# Patient Record
Sex: FEMALE | Race: WHITE | NOT HISPANIC OR LATINO | Employment: OTHER | URBAN - METROPOLITAN AREA
[De-identification: names, ages, dates, MRNs, and addresses within clinical notes are randomized per-mention and may not be internally consistent; named-entity substitution may affect disease eponyms.]

---

## 2017-01-05 ENCOUNTER — HOSPITAL ENCOUNTER (OUTPATIENT)
Dept: GENERAL RADIOLOGY | Facility: HOSPITAL | Age: 82
Discharge: HOME | End: 2017-01-05
Admitting: COLON & RECTAL SURGERY

## 2017-01-05 ENCOUNTER — APPOINTMENT (OUTPATIENT)
Dept: PREADMISSION TESTING | Facility: HOSPITAL | Age: 82
End: 2017-01-05

## 2017-01-05 VITALS — WEIGHT: 169.53 LBS | HEIGHT: 67 IN | BODY MASS INDEX: 26.61 KG/M2

## 2017-01-05 LAB
ALBUMIN SERPL-MCNC: 4 G/DL (ref 3.2–4.8)
ALBUMIN/GLOB SERPL: 1.6 G/DL (ref 1.5–2.5)
ALP SERPL-CCNC: 78 U/L (ref 25–100)
ALT SERPL W P-5'-P-CCNC: 16 U/L (ref 7–40)
ANION GAP SERPL CALCULATED.3IONS-SCNC: 10 MMOL/L (ref 3–11)
AST SERPL-CCNC: 18 U/L (ref 0–33)
BILIRUB SERPL-MCNC: 0.3 MG/DL (ref 0.3–1.2)
BUN BLD-MCNC: 10 MG/DL (ref 9–23)
BUN/CREAT SERPL: 9.1 (ref 7–25)
CALCIUM SPEC-SCNC: 10 MG/DL (ref 8.7–10.4)
CEA SERPL-MCNC: 2.4 NG/ML (ref 0–2.5)
CHLORIDE SERPL-SCNC: 103 MMOL/L (ref 99–109)
CO2 SERPL-SCNC: 29 MMOL/L (ref 20–31)
CREAT BLD-MCNC: 1.1 MG/DL (ref 0.6–1.3)
DEPRECATED RDW RBC AUTO: 54.5 FL (ref 37–54)
ERYTHROCYTE [DISTWIDTH] IN BLOOD BY AUTOMATED COUNT: 18.3 % (ref 11.3–14.5)
GFR SERPL CREATININE-BSD FRML MDRD: 48 ML/MIN/1.73
GLOBULIN UR ELPH-MCNC: 2.5 GM/DL
GLUCOSE BLD-MCNC: 108 MG/DL (ref 70–100)
HBA1C MFR BLD: 5.6 % (ref 4.8–5.6)
HCT VFR BLD AUTO: 35.4 % (ref 34.5–44)
HGB BLD-MCNC: 11.2 G/DL (ref 11.5–15.5)
MCH RBC QN AUTO: 25.6 PG (ref 27–31)
MCHC RBC AUTO-ENTMCNC: 31.6 G/DL (ref 32–36)
MCV RBC AUTO: 81 FL (ref 80–99)
PLATELET # BLD AUTO: 267 10*3/MM3 (ref 150–450)
PMV BLD AUTO: 8.5 FL (ref 6–12)
POTASSIUM BLD-SCNC: 3.8 MMOL/L (ref 3.5–5.5)
PROT SERPL-MCNC: 6.5 G/DL (ref 5.7–8.2)
RBC # BLD AUTO: 4.37 10*6/MM3 (ref 3.89–5.14)
SODIUM BLD-SCNC: 142 MMOL/L (ref 132–146)
WBC NRBC COR # BLD: 4.88 10*3/MM3 (ref 3.5–10.8)

## 2017-01-05 PROCEDURE — 85027 COMPLETE CBC AUTOMATED: CPT | Performed by: COLON & RECTAL SURGERY

## 2017-01-05 PROCEDURE — 83036 HEMOGLOBIN GLYCOSYLATED A1C: CPT | Performed by: COLON & RECTAL SURGERY

## 2017-01-05 PROCEDURE — 71020 HC CHEST PA AND LATERAL: CPT

## 2017-01-05 PROCEDURE — 82378 CARCINOEMBRYONIC ANTIGEN: CPT | Performed by: COLON & RECTAL SURGERY

## 2017-01-05 PROCEDURE — 80053 COMPREHEN METABOLIC PANEL: CPT | Performed by: COLON & RECTAL SURGERY

## 2017-01-05 PROCEDURE — 36415 COLL VENOUS BLD VENIPUNCTURE: CPT

## 2017-01-05 RX ORDER — CIPROFLOXACIN 500 MG/1
500 TABLET, FILM COATED ORAL 2 TIMES DAILY
COMMUNITY
Start: 2017-01-03 | End: 2017-01-13 | Stop reason: HOSPADM

## 2017-01-10 ENCOUNTER — HOSPITAL ENCOUNTER (INPATIENT)
Facility: HOSPITAL | Age: 82
LOS: 3 days | Discharge: HOME HEALTH | End: 2017-01-13
Attending: COLON & RECTAL SURGERY | Admitting: COLON & RECTAL SURGERY

## 2017-01-10 ENCOUNTER — ANESTHESIA EVENT (OUTPATIENT)
Dept: PERIOP | Facility: HOSPITAL | Age: 82
End: 2017-01-10

## 2017-01-10 ENCOUNTER — ANESTHESIA (OUTPATIENT)
Dept: PERIOP | Facility: HOSPITAL | Age: 82
End: 2017-01-10

## 2017-01-10 DIAGNOSIS — C18.9 COLON CANCER (HCC): ICD-10-CM

## 2017-01-10 LAB — POTASSIUM BLDA-SCNC: 2.98 MMOL/L (ref 3.5–5.3)

## 2017-01-10 PROCEDURE — 84132 ASSAY OF SERUM POTASSIUM: CPT | Performed by: ANESTHESIOLOGY

## 2017-01-10 PROCEDURE — 25010000003 POTASSIUM CHLORIDE 10 MEQ/100ML SOLUTION: Performed by: ANESTHESIOLOGY

## 2017-01-10 PROCEDURE — 0WQF0ZZ REPAIR ABDOMINAL WALL, OPEN APPROACH: ICD-10-PCS | Performed by: COLON & RECTAL SURGERY

## 2017-01-10 PROCEDURE — 25010000002 NEOSTIGMINE 10 MG/10ML SOLUTION: Performed by: NURSE ANESTHETIST, CERTIFIED REGISTERED

## 2017-01-10 PROCEDURE — 25010000002 HEPARIN (PORCINE) PER 1000 UNITS: Performed by: COLON & RECTAL SURGERY

## 2017-01-10 PROCEDURE — 25010000002 ONDANSETRON PER 1 MG: Performed by: NURSE ANESTHETIST, CERTIFIED REGISTERED

## 2017-01-10 PROCEDURE — 25010000002 DEXAMETHASONE SODIUM PHOSPHATE 10 MG/ML SOLUTION: Performed by: NURSE ANESTHETIST, CERTIFIED REGISTERED

## 2017-01-10 PROCEDURE — 25010000002 PROPOFOL 1000 MG/ML EMULSION: Performed by: NURSE ANESTHETIST, CERTIFIED REGISTERED

## 2017-01-10 PROCEDURE — 88309 TISSUE EXAM BY PATHOLOGIST: CPT | Performed by: COLON & RECTAL SURGERY

## 2017-01-10 PROCEDURE — 25010000002 BUPRENORPHINE PER 0.1 MG: Performed by: NURSE ANESTHETIST, CERTIFIED REGISTERED

## 2017-01-10 PROCEDURE — 25010000002 PROPOFOL 10 MG/ML EMULSION: Performed by: NURSE ANESTHETIST, CERTIFIED REGISTERED

## 2017-01-10 PROCEDURE — 0DTF0ZZ RESECTION OF RIGHT LARGE INTESTINE, OPEN APPROACH: ICD-10-PCS | Performed by: COLON & RECTAL SURGERY

## 2017-01-10 RX ORDER — HYDROCODONE BITARTRATE AND ACETAMINOPHEN 5; 325 MG/1; MG/1
1 TABLET ORAL EVERY 4 HOURS PRN
Status: DISCONTINUED | OUTPATIENT
Start: 2017-01-10 | End: 2017-01-12

## 2017-01-10 RX ORDER — PROMETHAZINE HYDROCHLORIDE 25 MG/ML
6.25 INJECTION, SOLUTION INTRAMUSCULAR; INTRAVENOUS ONCE AS NEEDED
Status: DISCONTINUED | OUTPATIENT
Start: 2017-01-10 | End: 2017-01-10 | Stop reason: HOSPADM

## 2017-01-10 RX ORDER — LIDOCAINE HYDROCHLORIDE 10 MG/ML
INJECTION, SOLUTION INFILTRATION; PERINEURAL AS NEEDED
Status: DISCONTINUED | OUTPATIENT
Start: 2017-01-10 | End: 2017-01-10 | Stop reason: SURG

## 2017-01-10 RX ORDER — SODIUM CHLORIDE, SODIUM LACTATE, POTASSIUM CHLORIDE, CALCIUM CHLORIDE 600; 310; 30; 20 MG/100ML; MG/100ML; MG/100ML; MG/100ML
9 INJECTION, SOLUTION INTRAVENOUS CONTINUOUS
Status: DISCONTINUED | OUTPATIENT
Start: 2017-01-10 | End: 2017-01-10 | Stop reason: SDUPTHER

## 2017-01-10 RX ORDER — FAMOTIDINE 10 MG/ML
20 INJECTION, SOLUTION INTRAVENOUS ONCE
Status: CANCELLED | OUTPATIENT
Start: 2017-01-10 | End: 2017-01-10

## 2017-01-10 RX ORDER — PROMETHAZINE HYDROCHLORIDE 25 MG/1
25 SUPPOSITORY RECTAL ONCE AS NEEDED
Status: DISCONTINUED | OUTPATIENT
Start: 2017-01-10 | End: 2017-01-10 | Stop reason: HOSPADM

## 2017-01-10 RX ORDER — CELECOXIB 200 MG/1
400 CAPSULE ORAL ONCE
Status: COMPLETED | OUTPATIENT
Start: 2017-01-10 | End: 2017-01-10

## 2017-01-10 RX ORDER — SCOLOPAMINE TRANSDERMAL SYSTEM 1 MG/1
1 PATCH, EXTENDED RELEASE TRANSDERMAL ONCE
Status: DISCONTINUED | OUTPATIENT
Start: 2017-01-10 | End: 2017-01-12

## 2017-01-10 RX ORDER — DEXAMETHASONE SODIUM PHOSPHATE 10 MG/ML
INJECTION, SOLUTION INTRAMUSCULAR; INTRAVENOUS AS NEEDED
Status: DISCONTINUED | OUTPATIENT
Start: 2017-01-10 | End: 2017-01-10 | Stop reason: SURG

## 2017-01-10 RX ORDER — ALVIMOPAN 12 MG/1
12 CAPSULE ORAL ONCE
Status: COMPLETED | OUTPATIENT
Start: 2017-01-10 | End: 2017-01-10

## 2017-01-10 RX ORDER — ROCURONIUM BROMIDE 10 MG/ML
INJECTION, SOLUTION INTRAVENOUS AS NEEDED
Status: DISCONTINUED | OUTPATIENT
Start: 2017-01-10 | End: 2017-01-10 | Stop reason: SURG

## 2017-01-10 RX ORDER — ONDANSETRON 2 MG/ML
INJECTION INTRAMUSCULAR; INTRAVENOUS AS NEEDED
Status: DISCONTINUED | OUTPATIENT
Start: 2017-01-10 | End: 2017-01-10 | Stop reason: SURG

## 2017-01-10 RX ORDER — FAMOTIDINE 20 MG/1
20 TABLET, FILM COATED ORAL ONCE
Status: COMPLETED | OUTPATIENT
Start: 2017-01-10 | End: 2017-01-10

## 2017-01-10 RX ORDER — SODIUM CHLORIDE 9 MG/ML
200 INJECTION, SOLUTION INTRAVENOUS CONTINUOUS
Status: DISCONTINUED | OUTPATIENT
Start: 2017-01-10 | End: 2017-01-12

## 2017-01-10 RX ORDER — GABAPENTIN 300 MG/1
600 CAPSULE ORAL 2 TIMES DAILY
Status: DISCONTINUED | OUTPATIENT
Start: 2017-01-10 | End: 2017-01-12

## 2017-01-10 RX ORDER — MAGNESIUM HYDROXIDE 1200 MG/15ML
LIQUID ORAL AS NEEDED
Status: DISCONTINUED | OUTPATIENT
Start: 2017-01-10 | End: 2017-01-10 | Stop reason: HOSPADM

## 2017-01-10 RX ORDER — HYDROMORPHONE HYDROCHLORIDE 1 MG/ML
0.5 INJECTION, SOLUTION INTRAMUSCULAR; INTRAVENOUS; SUBCUTANEOUS
Status: DISCONTINUED | OUTPATIENT
Start: 2017-01-10 | End: 2017-01-10 | Stop reason: HOSPADM

## 2017-01-10 RX ORDER — ALVIMOPAN 12 MG/1
12 CAPSULE ORAL 2 TIMES DAILY
Status: DISCONTINUED | OUTPATIENT
Start: 2017-01-11 | End: 2017-01-12

## 2017-01-10 RX ORDER — HEPARIN SODIUM 5000 [USP'U]/ML
5000 INJECTION, SOLUTION INTRAVENOUS; SUBCUTANEOUS EVERY 8 HOURS SCHEDULED
Status: DISCONTINUED | OUTPATIENT
Start: 2017-01-10 | End: 2017-01-13 | Stop reason: HOSPADM

## 2017-01-10 RX ORDER — FENTANYL CITRATE 50 UG/ML
50 INJECTION, SOLUTION INTRAMUSCULAR; INTRAVENOUS
Status: DISCONTINUED | OUTPATIENT
Start: 2017-01-10 | End: 2017-01-10 | Stop reason: HOSPADM

## 2017-01-10 RX ORDER — ACETAMINOPHEN 500 MG
1000 TABLET ORAL 3 TIMES DAILY
Status: DISCONTINUED | OUTPATIENT
Start: 2017-01-10 | End: 2017-01-13 | Stop reason: HOSPADM

## 2017-01-10 RX ORDER — DIAZEPAM 5 MG/ML
2.5 INJECTION, SOLUTION INTRAMUSCULAR; INTRAVENOUS EVERY 6 HOURS PRN
Status: DISCONTINUED | OUTPATIENT
Start: 2017-01-10 | End: 2017-01-12

## 2017-01-10 RX ORDER — LEVOTHYROXINE SODIUM 0.07 MG/1
75 TABLET ORAL
Status: DISCONTINUED | OUTPATIENT
Start: 2017-01-11 | End: 2017-01-13 | Stop reason: HOSPADM

## 2017-01-10 RX ORDER — LIDOCAINE HYDROCHLORIDE 10 MG/ML
1 INJECTION, SOLUTION EPIDURAL; INFILTRATION; INTRACAUDAL; PERINEURAL ONCE
Status: COMPLETED | OUTPATIENT
Start: 2017-01-10 | End: 2017-01-10

## 2017-01-10 RX ORDER — PROPOFOL 10 MG/ML
VIAL (ML) INTRAVENOUS AS NEEDED
Status: DISCONTINUED | OUTPATIENT
Start: 2017-01-10 | End: 2017-01-10 | Stop reason: SURG

## 2017-01-10 RX ORDER — PROPRANOLOL HYDROCHLORIDE 20 MG/1
60 TABLET ORAL EVERY 12 HOURS SCHEDULED
Status: DISCONTINUED | OUTPATIENT
Start: 2017-01-10 | End: 2017-01-10

## 2017-01-10 RX ORDER — NEOSTIGMINE METHYLSULFATE 1 MG/ML
INJECTION, SOLUTION INTRAVENOUS AS NEEDED
Status: DISCONTINUED | OUTPATIENT
Start: 2017-01-10 | End: 2017-01-10 | Stop reason: SURG

## 2017-01-10 RX ORDER — PROPRANOLOL HYDROCHLORIDE 20 MG/1
60 TABLET ORAL EVERY 12 HOURS SCHEDULED
Status: DISCONTINUED | OUTPATIENT
Start: 2017-01-11 | End: 2017-01-13 | Stop reason: HOSPADM

## 2017-01-10 RX ORDER — GLYCOPYRROLATE 0.2 MG/ML
INJECTION INTRAMUSCULAR; INTRAVENOUS AS NEEDED
Status: DISCONTINUED | OUTPATIENT
Start: 2017-01-10 | End: 2017-01-10 | Stop reason: SURG

## 2017-01-10 RX ORDER — SODIUM CHLORIDE 0.9 % (FLUSH) 0.9 %
1-10 SYRINGE (ML) INJECTION AS NEEDED
Status: DISCONTINUED | OUTPATIENT
Start: 2017-01-10 | End: 2017-01-10 | Stop reason: HOSPADM

## 2017-01-10 RX ORDER — NALOXONE HCL 0.4 MG/ML
0.4 VIAL (ML) INJECTION
Status: DISCONTINUED | OUTPATIENT
Start: 2017-01-10 | End: 2017-01-12

## 2017-01-10 RX ORDER — POTASSIUM CHLORIDE 7.45 MG/ML
10 INJECTION INTRAVENOUS ONCE
Status: COMPLETED | OUTPATIENT
Start: 2017-01-10 | End: 2017-01-10

## 2017-01-10 RX ORDER — PROMETHAZINE HYDROCHLORIDE 25 MG/1
25 TABLET ORAL ONCE AS NEEDED
Status: DISCONTINUED | OUTPATIENT
Start: 2017-01-10 | End: 2017-01-10 | Stop reason: HOSPADM

## 2017-01-10 RX ORDER — BUPIVACAINE HYDROCHLORIDE 2.5 MG/ML
INJECTION, SOLUTION EPIDURAL; INFILTRATION; INTRACAUDAL AS NEEDED
Status: DISCONTINUED | OUTPATIENT
Start: 2017-01-10 | End: 2017-01-10 | Stop reason: SURG

## 2017-01-10 RX ORDER — PREGABALIN 75 MG/1
75 CAPSULE ORAL ONCE
Status: COMPLETED | OUTPATIENT
Start: 2017-01-10 | End: 2017-01-10

## 2017-01-10 RX ORDER — MORPHINE SULFATE 2 MG/ML
2 INJECTION, SOLUTION INTRAMUSCULAR; INTRAVENOUS
Status: DISCONTINUED | OUTPATIENT
Start: 2017-01-10 | End: 2017-01-12

## 2017-01-10 RX ORDER — HEPARIN SODIUM 5000 [USP'U]/ML
5000 INJECTION, SOLUTION INTRAVENOUS; SUBCUTANEOUS ONCE
Status: COMPLETED | OUTPATIENT
Start: 2017-01-10 | End: 2017-01-10

## 2017-01-10 RX ORDER — BUPRENORPHINE HYDROCHLORIDE 0.32 MG/ML
INJECTION INTRAMUSCULAR; INTRAVENOUS AS NEEDED
Status: DISCONTINUED | OUTPATIENT
Start: 2017-01-10 | End: 2017-01-10 | Stop reason: SURG

## 2017-01-10 RX ORDER — ACETAMINOPHEN 500 MG
1000 TABLET ORAL ONCE
Status: COMPLETED | OUTPATIENT
Start: 2017-01-10 | End: 2017-01-10

## 2017-01-10 RX ORDER — ONDANSETRON 2 MG/ML
4 INJECTION INTRAMUSCULAR; INTRAVENOUS EVERY 6 HOURS PRN
Status: DISCONTINUED | OUTPATIENT
Start: 2017-01-10 | End: 2017-01-13 | Stop reason: HOSPADM

## 2017-01-10 RX ORDER — DIAZEPAM 5 MG/1
5 TABLET ORAL EVERY 6 HOURS PRN
Status: DISCONTINUED | OUTPATIENT
Start: 2017-01-10 | End: 2017-01-12

## 2017-01-10 RX ADMIN — GLYCOPYRROLATE 0.2 MG: 0.2 INJECTION, SOLUTION INTRAMUSCULAR; INTRAVENOUS at 13:49

## 2017-01-10 RX ADMIN — SODIUM CHLORIDE, POTASSIUM CHLORIDE, SODIUM LACTATE AND CALCIUM CHLORIDE 9 ML/HR: 600; 310; 30; 20 INJECTION, SOLUTION INTRAVENOUS at 11:41

## 2017-01-10 RX ADMIN — POTASSIUM CHLORIDE 10 MEQ: 7.46 INJECTION, SOLUTION INTRAVENOUS at 12:28

## 2017-01-10 RX ADMIN — PREGABALIN 75 MG: 75 CAPSULE ORAL at 12:31

## 2017-01-10 RX ADMIN — LIDOCAINE HYDROCHLORIDE 0.2 ML: 10 INJECTION, SOLUTION EPIDURAL; INFILTRATION; INTRACAUDAL; PERINEURAL at 11:41

## 2017-01-10 RX ADMIN — GLYCOPYRROLATE 0.4 MG: 0.2 INJECTION, SOLUTION INTRAMUSCULAR; INTRAVENOUS at 14:14

## 2017-01-10 RX ADMIN — ERTAPENEM SODIUM 1 G: 1 INJECTION, POWDER, LYOPHILIZED, FOR SOLUTION INTRAMUSCULAR; INTRAVENOUS at 13:04

## 2017-01-10 RX ADMIN — SODIUM CHLORIDE 100 ML/HR: 9 INJECTION, SOLUTION INTRAVENOUS at 15:12

## 2017-01-10 RX ADMIN — ONDANSETRON 4 MG: 2 INJECTION INTRAMUSCULAR; INTRAVENOUS at 14:12

## 2017-01-10 RX ADMIN — BUPIVACAINE HYDROCHLORIDE 30 ML: 2.5 INJECTION, SOLUTION EPIDURAL; INFILTRATION; INTRACAUDAL; PERINEURAL at 13:17

## 2017-01-10 RX ADMIN — NEOSTIGMINE METHYLSULFATE 2 MG: 1 INJECTION, SOLUTION INTRAVENOUS at 14:25

## 2017-01-10 RX ADMIN — LIDOCAINE HYDROCHLORIDE 50 MG: 10 INJECTION, SOLUTION INFILTRATION; PERINEURAL at 13:13

## 2017-01-10 RX ADMIN — ROCURONIUM BROMIDE 10 MG: 10 INJECTION INTRAVENOUS at 14:00

## 2017-01-10 RX ADMIN — BUPRENORPHINE HYDROCHLORIDE 150 MCG: 0.3 INJECTION INTRAMUSCULAR; INTRAVENOUS at 13:14

## 2017-01-10 RX ADMIN — EPHEDRINE SULFATE 10 MG: 50 INJECTION INTRAMUSCULAR; INTRAVENOUS; SUBCUTANEOUS at 13:46

## 2017-01-10 RX ADMIN — CELECOXIB 400 MG: 200 CAPSULE ORAL at 12:32

## 2017-01-10 RX ADMIN — DEXAMETHASONE SODIUM PHOSPHATE 2 MG: 10 INJECTION, SOLUTION INTRAMUSCULAR; INTRAVENOUS at 13:17

## 2017-01-10 RX ADMIN — GABAPENTIN 600 MG: 300 CAPSULE ORAL at 21:43

## 2017-01-10 RX ADMIN — ACETAMINOPHEN 1000 MG: 500 TABLET ORAL at 12:31

## 2017-01-10 RX ADMIN — BUPRENORPHINE HYDROCHLORIDE 150 MCG: 0.3 INJECTION INTRAMUSCULAR; INTRAVENOUS at 13:17

## 2017-01-10 RX ADMIN — BUPIVACAINE HYDROCHLORIDE 30 ML: 2.5 INJECTION, SOLUTION EPIDURAL; INFILTRATION; INTRACAUDAL; PERINEURAL at 13:14

## 2017-01-10 RX ADMIN — DEXAMETHASONE SODIUM PHOSPHATE 2 MG: 10 INJECTION, SOLUTION INTRAMUSCULAR; INTRAVENOUS at 13:14

## 2017-01-10 RX ADMIN — NEOSTIGMINE METHYLSULFATE 3 MG: 1 INJECTION, SOLUTION INTRAVENOUS at 14:14

## 2017-01-10 RX ADMIN — HEPARIN SODIUM 5000 UNITS: 5000 INJECTION, SOLUTION INTRAVENOUS; SUBCUTANEOUS at 12:06

## 2017-01-10 RX ADMIN — PROPOFOL 200 MG: 10 INJECTION, EMULSION INTRAVENOUS at 13:13

## 2017-01-10 RX ADMIN — GLYCOPYRROLATE 0.4 MG: 0.2 INJECTION, SOLUTION INTRAMUSCULAR; INTRAVENOUS at 14:25

## 2017-01-10 RX ADMIN — PROPOFOL 25 MCG/KG/MIN: 10 INJECTION, EMULSION INTRAVENOUS at 13:24

## 2017-01-10 RX ADMIN — ACETAMINOPHEN 1000 MG: 500 TABLET, FILM COATED ORAL at 21:43

## 2017-01-10 RX ADMIN — SODIUM CHLORIDE, POTASSIUM CHLORIDE, SODIUM LACTATE AND CALCIUM CHLORIDE: 600; 310; 30; 20 INJECTION, SOLUTION INTRAVENOUS at 13:02

## 2017-01-10 RX ADMIN — HEPARIN SODIUM 5000 UNITS: 5000 INJECTION, SOLUTION INTRAVENOUS; SUBCUTANEOUS at 21:43

## 2017-01-10 RX ADMIN — DEXAMETHASONE SODIUM PHOSPHATE 6 MG: 10 INJECTION, SOLUTION INTRAMUSCULAR; INTRAVENOUS at 13:27

## 2017-01-10 RX ADMIN — ROCURONIUM BROMIDE 30 MG: 10 INJECTION INTRAVENOUS at 13:13

## 2017-01-10 RX ADMIN — FAMOTIDINE 20 MG: 20 TABLET ORAL at 12:31

## 2017-01-10 RX ADMIN — ALVIMOPAN 12 MG: 12 CAPSULE ORAL at 12:31

## 2017-01-10 RX ADMIN — SODIUM CHLORIDE 1000 ML: 9 INJECTION, SOLUTION INTRAVENOUS at 16:30

## 2017-01-10 RX ADMIN — SCOPALAMINE 1 PATCH: 1 PATCH, EXTENDED RELEASE TRANSDERMAL at 12:05

## 2017-01-10 NOTE — H&P
"[facility] Pre-op    Full history and physical note from office is up to date.  See paper copy on chart.    Visit Vitals   • /63 (BP Location: Right arm, Patient Position: Lying)   • Pulse 66   • Temp 98.3 °F (36.8 °C) (Temporal Artery )   • Resp 18   • Ht 67\" (170.2 cm)   • Wt 171 lb (77.6 kg)   • SpO2 94%   • BMI 26.78 kg/m2       IMM:  Influenza:2016  Pneumococcal: UTD  Tetanus: Unknown    Fidelina Tovar, EDUARDO 1/10/2017 12:00 PM   [facility] Pre-op    "

## 2017-01-10 NOTE — IP AVS SNAPSHOT
AFTER VISIT SUMMARY             Janet Truong           About your hospitalization     You were admitted on:  January 10, 2017 You last received care in the:  [address]       Procedures & Surgeries      Procedure(s) (LRB):  EXTENDED RIGHT HEMICOLECTOMY UMBILICAL HERNIA REPAIR (N/A)     1/10/2017     Surgeon(s):  Hernan Menon MD  -------------------      Medications    If you or your caregiver advised us that you are currently taking a medication and that medication is marked below as “Resume”, this simply indicates that we have reviewed those medications to make sure our new therapy recommendations do not interfere.  If you have concerns about medications other than those new ones which we are prescribing today, please consult the physician who prescribed them (or your primary physician).  Our review of your home medications is not meant to indicate that we are directing their use.             Your Medications      START taking these medications     fluconazole 100 MG tablet   Take 1 tablet by mouth Daily for 10 days.   Commonly known as:  DIFLUCAN             CONTINUE taking these medications     albuterol (2.5 MG/3ML) 0.083% nebulizer solution   Take 2.5 mg by nebulization 2 (Two) Times a Day.   Commonly known as:  PROVENTIL           ferrous sulfate 325 (65 FE) MG tablet   Take 325 mg by mouth 2 (Two) Times a Day.           I-tamia tablet tablet   Take 1 tablet by mouth Daily.           levothyroxine 75 MCG tablet   Take 75 mcg by mouth Daily.   Last time this was given:  1/13/2017  5:39 AM   Commonly known as:  SYNTHROID, LEVOTHROID           primidone 50 MG tablet   Take 50 mg by mouth 2 (Two) Times a Day.   Commonly known as:  MYSOLINE           propranolol 60 MG tablet   Take 60 mg by mouth 2 (Two) Times a Day.   Last time this was given:  1/13/2017  8:39 AM   Commonly known as:  INDERAL           PROTONIX 40 MG EC tablet   Take 40 mg by mouth 2 (Two) Times a Day.   Generic drug:   pantoprazole           tiotropium 18 MCG per inhalation capsule   Place 2 capsules into inhaler and inhale Daily.   Commonly known as:  SPIRIVA             STOP taking these medications     ciprofloxacin 500 MG tablet   Commonly known as:  CIPRO                Where to Get Your Medications      These medications were sent to [facility] - [place], [place] - [address] - [phone]  - [phone] FX  [address] [facility] [address]     Phone:  [phone]     fluconazole 100 MG tablet                  Your Medications      Your Medication List           Morning Noon Evening Bedtime As Needed    albuterol (2.5 MG/3ML) 0.083% nebulizer solution   Take 2.5 mg by nebulization 2 (Two) Times a Day.   Commonly known as:  PROVENTIL                                ferrous sulfate 325 (65 FE) MG tablet   Take 325 mg by mouth 2 (Two) Times a Day.                                fluconazole 100 MG tablet   Take 1 tablet by mouth Daily for 10 days.   Commonly known as:  DIFLUCAN                                I-tamia tablet tablet   Take 1 tablet by mouth Daily.                                levothyroxine 75 MCG tablet   Take 75 mcg by mouth Daily.   Commonly known as:  SYNTHROID, LEVOTHROID                                primidone 50 MG tablet   Take 50 mg by mouth 2 (Two) Times a Day.   Commonly known as:  MYSOLINE                                propranolol 60 MG tablet   Take 60 mg by mouth 2 (Two) Times a Day.   Commonly known as:  INDERAL                                PROTONIX 40 MG EC tablet   Take 40 mg by mouth 2 (Two) Times a Day.   Generic drug:  pantoprazole                                tiotropium 18 MCG per inhalation capsule   Place 2 capsules into inhaler and inhale Daily.   Commonly known as:  SPIRIVA                                         Instructions for After Discharge         Follow-ups for After Discharge        Referrals and Follow-ups to Schedule     Call Dr. Menon Office [phone]  for an appointment in 1-2 weeks.            MyChart Signup     Our records indicate that you have declined [facility] sign[facility]. If you would like to sign up for [employer], please email [email or web address] or call [phone] to obtain an activation code.         Summary of Your Hospitalization        Reason for Hospitalization     Your primary diagnosis was:  Not on File    Your diagnoses also included:  Colon Cancer      Care Providers     Provider Service Role Specialty    Hernan Menon MD Colorectal Attending Provider Colon and Rectal Surgery    Hernan Menon MD Colorectal Surgeon  Colon and Rectal Surgery      Your Allergies  Date Reviewed: 1/10/2017    Allergen Reactions    Phenobarbital Rash      Patient Belongings Returned     Document Return of Belongings Flowsheet     Were the patient bedside belongings sent home?   --   Belongings Retrieved from Security & Sent Home   --    Belongings Sent to Safe   --   Medications Retrieved from Pharmacy & Sent Home   --            PREVENTING SURGICAL SITE INFECTIONS     Surgical Site Infections FAQs  What is a Surgical Site Infection (SSI)?  A surgical site infection is an infection that occurs after surgery in the part of the body where the surgery took place. Most patients who have surgery do not develop an infection. However, infections develop in about 1 to 3 out of every 100 patients who have surgery.  Some of the common symptoms of a surgical site infection are:  · Redness and pain around the area where you had surgery  · Drainage of cloudy fluid from your surgical wound  · Fever  Can SSIs be treated?  Yes. Most surgical site infections can be treated with antibiotics. The antibiotic given to you depends on the bacteria (germs) causing the infection. Sometimes patients with SSIs also need another surgery to treat the infection.  What are some of the things that hospitals are doing to prevent SSIs?  To prevent SSIs, doctors, nurses, and  other healthcare providers:  · Clean their hands and arms up to their elbows with an antiseptic agent just before the surgery.  · Clean their hands with soap and water or an alcohol-based hand rub before and after caring for each patient.  · May remove some of your hair immediately before your surgery using electric clippers if the hair is in the same area where the procedure will occur. They should not shave you with a razor.  · Wear special hair covers, masks, gowns, and gloves during surgery to keep the surgery area clean.  · Give you antibiotics before your surgery starts. In most cases, you should get antibiotics within 60 minutes before the surgery starts and the antibiotics should be stopped within 24 hours after surgery.  · Clean the skin at the site of your surgery with a special soap that kills germs.  What can I do to help prevent SSIs?  Before your surgery:  · Tell your doctor about other medical problems you may have. Health problems such as allergies, diabetes, and obesity could affect your surgery and your treatment.  · Quit smoking. Patients who smoke get more infections. Talk to your doctor about how you can quit before your surgery.  · Do not shave near where you will have surgery. Shaving with a razor can irritate your skin and make it easier to develop an infection.  At the time of your surgery:  · Speak up if someone tries to shave you with a razor before surgery. Ask why you need to be shaved and talk with your surgeon if you have any concerns.  · Ask if you will get antibiotics before surgery.  After your surgery:  · Make sure that your healthcare providers clean their hands before examining you, either with soap and water or an alcohol-based hand rub.    If you do not see your providers clean their hands, please ask them to do so.  · Family and friends who visit you should not touch the surgical wound or dressings.  · Family and friends should clean their hands with soap and water or an  alcohol-based hand rub before and after visiting you. If you do not see them clean their hands, ask them to clean their hands.  What do I need to do when I go home from the hospital?  · Before you go home, your doctor or nurse should explain everything you need to know about taking care of your wound. Make sure you understand how to care for your wound before you leave the hospital.  · Always clean your hands before and after caring for your wound.  · Before you go home, make sure you know who to contact if you have questions or problems after you get home.  · If you have any symptoms of an infection, such as redness and pain at the surgery site, drainage, or fever, call your doctor immediately.  If you have additional questions, please ask your doctor or nurse.  Developed and co-sponsored by The Society for Healthcare Epidemiology of [place] (SHEA); Infectious Diseases Society of [place] (IDSA); American Hospital Association; Association for Professionals in Infection Control and Epidemiology (APIC); Centers for Disease Control and Prevention (CDC); and The Joint Commission.     This information is not intended to replace advice given to you by your health care provider. Make sure you discuss any questions you have with your health care provider.     Document Released: 12/23/2014 Document Revised: 01/08/2016 Document Reviewed: 03/02/2016  [employer] Interactive Patient Education ©2016 [employer]             SYMPTOMS OF A STROKE    Call 911 or have someone take you to the Emergency Department if you have any of the following:    · Sudden numbness or weakness of your face, arm or leg especially on one side of the body  · Sudden confusion, diffiiculty speaking or trouble understanding   · Changes in your vision or loss of sight in one eye  · Sudden severe headache with no known cause  · sudden dizziness, trouble walking, loss of balance or coordination    It is important to seek emergency care right away if you have  further stroke symptoms. If you get emergency help quickly, the powerful clot-dissolving medicines can reduce the disabilities caused by a stroke.     For more information:    American Stroke Association  5-576-8-STROKE  www.strokeassociation.org           IF YOU SMOKE OR USE TOBACCO PLEASE READ THE FOLLOWING:    Why is smoking bad for me?  Smoking increases the risk of heart disease, lung disease, vascular disease, stroke, and cancer.     If you smoke, STOP!    If you would like more information on quitting smoking, please visit the [employer] website: [email or web address]   This link will provide additional resources including the QUIT line and the Beat the Pack support groups.     For more information:    American Cancer Society  (102) 109-5032    American Heart Association  1-928.661.8776               YOU ARE THE MOST IMPORTANT FACTOR IN YOUR RECOVERY.     Follow all instructions carefully.     I have reviewed my discharge instructions with my nurse, including the following information, if applicable:     Information about my illness and diagnosis   Follow up appointments (including lab draws)   Wound Care   Equipment Needs   Medications (new and continuing) along with side effects   Preventative information such as vaccines and smoking cessations   Diet   Pain   I know when to contact my Doctor's office or seek emergency care      I want my nurse to describe the side effects of my medications: YES NO   If the answer is no, I understand the side effects of my medications: YES NO   My nurse described the side effects of my medications in a way that I could understand: YES NO   I have taken my personal belongings and my own medications with me at discharge: YES NO            I have received this information and my questions have been answered. I have discussed any concerns I see with this plan with the nurse or physician. I understand these  instructions.    Signature of Patient or Responsible Person: _____________________________________    Date: _________________  Time: __________________    Signature of Healthcare Provider: _______________________________________  Date: _________________  Time: __________________

## 2017-01-10 NOTE — ANESTHESIA POSTPROCEDURE EVALUATION
Patient: Janet Truong    Procedure Summary     Date Anesthesia Start Anesthesia Stop Room / Location    01/10/17 1304  [facility] / [facility]       Procedure Diagnosis Surgeon Provider    EXTENDED RIGHT HEMICOLECTOMY UMBILICAL HERNIA REPAIR (N/A Abdomen) No diagnosis on file. MD Ang St MD          Anesthesia Type: general  Last vitals  BP      Temp      Pulse     Resp      SpO2        Post Anesthesia Care and Evaluation    Patient location during evaluation: PACU  Patient participation: complete - patient participated  Level of consciousness: awake and alert  Pain management: adequate  Airway patency: patent  Anesthetic complications: No anesthetic complications  Cardiovascular status: hemodynamically stable and acceptable  Respiratory status: nonlabored ventilation, acceptable and nasal cannula  Hydration status: acceptable

## 2017-01-10 NOTE — OP NOTE
DATE OF PROCEDURE:  01/10/2017    PREOPERATIVE DIAGNOSES:  1. Right colon cancer.  2. Hepatic flexure polyp.    POSTOPERATIVE DIAGNOSES:  1. Right colon cancer.  2. Hepatic flexure polyp.  3. Small umbilical hernia.     PROCEDURES PERFORMED:  1. Extended right hemicolectomy.   2. Umbilical hernia repair.     SURGEON: Hernan Menon MD    HISTORY OF PRESENT ILLNESS: An 81-year-old female who had a screening colonoscopy by Dr. Pierre who removed multiple large polyps. He was unable to clear a benign looking polyp near the hepatic flexure and he put an ink spot in that. He found a small cancer in the right colon warranting this resection.     DESCRIPTION OF PROCEDURE: The patient was taken to the operating room and placed under general anesthesia, positioned supine and a Edwards anchored. TAP block was performed by anesthesia. Her abdomen was prepped and draped appropriately followed by a timeout. The abdomen was opened through a lower midline incision and extended slightly above the umbilicus. Fat pad was removed from a small umbilical hernia. The small bowel was run and was normal. The uterus, ovaries, and gallbladder were absent. A fair amount of scar was present in the right upper quadrant, but the liver felt and looked normal. An ink spot was found in the proximal transverse colon. I could not feel the right colon lesion.     The omentum was taken off the proximal half of the transverse colon and the lesser sac was entered to deflect the stomach superiorly. The hepatic flexure was taken down and then I was able to mobilize the entire right colon off the line of Toldt and sweep it medial off the duodenum. The cecum and ileum were mobilized out of the pelvis. High ligation of the right colic was carried out. I then was able to line up the ileum next to the midtransverse colon and I freed the mesentery from spots on both. All the other blood supply had been removed. Antimesenteric enterotomies were made in both, and  then a TRACIE-75 green stapler was used to make an anastomosis between the terminal ileum and transverse colon. Reapplication of the stapler perpendicular to the original firing was used to complete the resection and close the enterotomy. The enterotomy was wide open and the lines looked good. I opened the specimen on the back table showing a benign hepatic flexure polyp and about a 15 or 18 mm right colon cancer right near the valve.     The abdomen was irrigated with saline and the GI contents placed back anatomically with the omentum in the pelvis. With the sponge, instrument and needle count being correct, the fascia was closed in a single layer with #1 PDS. Subcutaneous tissue was irrigated with saline and skin closed with staples, and a Telfa dressing applied. The umbilical hernia was closed with the fascial repair. Blood loss was under 50 mL. She tolerated the procedure well.       MD DESTINI Thibodeaux/olvin  DD: 01/10/2017 14:23:30  DT: 01/10/2017 18:33:23  Voice Rec. ID #[id number]  Voice Original ID #[id number]  Doc ID #[id number]  Rev. #0  cc:

## 2017-01-10 NOTE — ANESTHESIA PROCEDURE NOTES
Airway  Urgency: elective    Airway not difficult    General Information and Staff    Patient location during procedure: OR  Anesthesiologist: YINA PINZON    Indications and Patient Condition  Indications for airway management: airway protection    Preoxygenated: yes  MILS not maintained throughout  Mask difficulty assessment: 1 - vent by mask    Final Airway Details  Final airway type: endotracheal airway      Successful airway: ETT  Cuffed: yes   Successful intubation technique: direct laryngoscopy  Endotracheal tube insertion site: oral  Blade: Isis  Blade size: #3  ETT size: 7.0 mm  Cormack-Lehane Classification: grade I - full view of glottis  Placement verified by: chest auscultation and capnometry   Measured from: lips  ETT to lips (cm): 20  Number of attempts at approach: 1    Additional Comments  Negative epigastric sounds, Breath sound equal bilaterally with symmetric chest rise and fall

## 2017-01-10 NOTE — ANESTHESIA PREPROCEDURE EVALUATION
Anesthesia Evaluation     Patient summary reviewed and Nursing notes reviewed    No history of anesthetic complications   Airway   Mallampati: II  TM distance: >3 FB  Neck ROM: full  no difficulty expected  Dental      Pulmonary - normal exam   (+) COPD,   Cardiovascular - normal exam  (+) past MI , CAD (ef 60% echo 11/16), cardiac stents more than 12 months ago     Neuro/Psych- negative ROS  GI/Hepatic/Renal/Endo    (+)  GERD, hypothyroidism,     Musculoskeletal     Abdominal    Substance History      OB/GYN          Other   (+) arthritis                          Anesthesia Plan    ASA 3     general   (Tap)  intravenous induction   Anesthetic plan and risks discussed with patient.    Plan discussed with CRNA.

## 2017-01-10 NOTE — ANESTHESIA PROCEDURE NOTES
"Peripheral Block    Patient location during procedure: pre-op  Reason for block: at surgeon's request and post-op pain management  Performed by  Anesthesiologist: YINA PINZON  CRNA: RONNIE PITTMAN  Preanesthetic Checklist  Completed: patient identified, site marked, surgical consent, pre-op evaluation, timeout performed, IV checked, risks and benefits discussed and monitors and equipment checked  Peripheral Block Prep:  Sterile barriers:cap, gloves, sterile barriers and mask  Prep: ChloraPrep  Patient monitoring: blood pressure monitoring, continuous pulse oximetry and EKG  Peripheral Procedure  Nursing cardiac assessment comments yes: Sedation, GA, Spinal,Epidural   Guidance:ultrasound guided  Images:still images obtained  Laterality:BilateralBlock Type:TAP  Injection Technique:single-shotNeedle Type:short-bevel  Needle Gauge:20 G  Needle gauge: 20g 4\" Stimuplex.   Medications  Comment:Block Injection:  LA dose divided between Right and Left block       Adjuncts:  Decadron 4mg PSF, Buprenex 0.3mg (Per total volume of LA)  Local Injected:bupivacaine 0.25% without epinephrine Local Amount Injected:60mL  Post Assessment  Patient Tolerance:comfortable throughout block  Complications:no  Additional Notes  The pt was placed in the Supine Position and was  anesthetized with:       General Anesthesia     Under Ultrasound guidance, a BBraun 4inch 360 degree needle was advanced with Normal Saline hydro dissection of tissue.  The Internal Oblique and Transversus Abdominus muscles where visualized.  At or before the aponeurosis of Internal Oblique, local anesthetic spread was visualized in the Transversus Abdominus Plane. Injection was made incrementally with aspiration every 5 mls.  There was no  intravascular injection,  injection pressure was normal, there was no neural injection, and the procedure was completed without difficulty.  Thank You.            "

## 2017-01-11 LAB
ABO GROUP BLD: NORMAL
ANION GAP SERPL CALCULATED.3IONS-SCNC: 6 MMOL/L (ref 3–11)
BASOPHILS # BLD AUTO: 0.05 10*3/MM3 (ref 0–0.2)
BASOPHILS NFR BLD AUTO: 0.8 % (ref 0–1)
BLD GP AB SCN SERPL QL: NEGATIVE
BUN BLD-MCNC: 8 MG/DL (ref 9–23)
BUN/CREAT SERPL: 8.9 (ref 7–25)
CA-I SERPL ISE-MCNC: 1.14 MMOL/L (ref 1.12–1.32)
CALCIUM SPEC-SCNC: 7.7 MG/DL (ref 8.7–10.4)
CHLORIDE SERPL-SCNC: 111 MMOL/L (ref 99–109)
CO2 SERPL-SCNC: 28 MMOL/L (ref 20–31)
CREAT BLD-MCNC: 0.9 MG/DL (ref 0.6–1.3)
DEPRECATED RDW RBC AUTO: 56.3 FL (ref 37–54)
EOSINOPHIL # BLD AUTO: 0.03 10*3/MM3 (ref 0.1–0.3)
EOSINOPHIL NFR BLD AUTO: 0.5 % (ref 0–3)
ERYTHROCYTE [DISTWIDTH] IN BLOOD BY AUTOMATED COUNT: 18.2 % (ref 11.3–14.5)
GFR SERPL CREATININE-BSD FRML MDRD: 60 ML/MIN/1.73
GLUCOSE BLD-MCNC: 80 MG/DL (ref 70–100)
HCT VFR BLD AUTO: 28.4 % (ref 34.5–44)
HGB BLD-MCNC: 8.5 G/DL (ref 11.5–15.5)
IMM GRANULOCYTES # BLD: 0 10*3/MM3 (ref 0–0.03)
IMM GRANULOCYTES NFR BLD: 0 % (ref 0–0.6)
LYMPHOCYTES # BLD AUTO: 1.29 10*3/MM3 (ref 0.6–4.8)
LYMPHOCYTES NFR BLD AUTO: 19.8 % (ref 24–44)
MAGNESIUM SERPL-MCNC: 2.3 MG/DL (ref 1.3–2.7)
MCH RBC QN AUTO: 25.1 PG (ref 27–31)
MCHC RBC AUTO-ENTMCNC: 29.9 G/DL (ref 32–36)
MCV RBC AUTO: 84 FL (ref 80–99)
MONOCYTES # BLD AUTO: 0.66 10*3/MM3 (ref 0–1)
MONOCYTES NFR BLD AUTO: 10.2 % (ref 0–12)
NEUTROPHILS # BLD AUTO: 4.47 10*3/MM3 (ref 1.5–8.3)
NEUTROPHILS NFR BLD AUTO: 68.7 % (ref 41–71)
PHOSPHATE SERPL-MCNC: 3.9 MG/DL (ref 2.4–5.1)
PLATELET # BLD AUTO: 234 10*3/MM3 (ref 150–450)
PMV BLD AUTO: 8.9 FL (ref 6–12)
POTASSIUM BLD-SCNC: 3.4 MMOL/L (ref 3.5–5.5)
RBC # BLD AUTO: 3.38 10*6/MM3 (ref 3.89–5.14)
RH BLD: POSITIVE
SODIUM BLD-SCNC: 145 MMOL/L (ref 132–146)
WBC NRBC COR # BLD: 6.5 10*3/MM3 (ref 3.5–10.8)

## 2017-01-11 PROCEDURE — P9046 ALBUMIN (HUMAN), 25%, 20 ML: HCPCS | Performed by: COLON & RECTAL SURGERY

## 2017-01-11 PROCEDURE — 86920 COMPATIBILITY TEST SPIN: CPT

## 2017-01-11 PROCEDURE — 25010000002 ALBUMIN HUMAN 25% PER 50 ML: Performed by: COLON & RECTAL SURGERY

## 2017-01-11 PROCEDURE — 86850 RBC ANTIBODY SCREEN: CPT

## 2017-01-11 PROCEDURE — 94640 AIRWAY INHALATION TREATMENT: CPT

## 2017-01-11 PROCEDURE — 84100 ASSAY OF PHOSPHORUS: CPT | Performed by: COLON & RECTAL SURGERY

## 2017-01-11 PROCEDURE — 80048 BASIC METABOLIC PNL TOTAL CA: CPT | Performed by: COLON & RECTAL SURGERY

## 2017-01-11 PROCEDURE — 82330 ASSAY OF CALCIUM: CPT | Performed by: COLON & RECTAL SURGERY

## 2017-01-11 PROCEDURE — 85025 COMPLETE CBC W/AUTO DIFF WBC: CPT | Performed by: COLON & RECTAL SURGERY

## 2017-01-11 PROCEDURE — 83735 ASSAY OF MAGNESIUM: CPT | Performed by: COLON & RECTAL SURGERY

## 2017-01-11 PROCEDURE — 86901 BLOOD TYPING SEROLOGIC RH(D): CPT

## 2017-01-11 PROCEDURE — 25010000002 HEPARIN (PORCINE) PER 1000 UNITS: Performed by: COLON & RECTAL SURGERY

## 2017-01-11 PROCEDURE — 94799 UNLISTED PULMONARY SVC/PX: CPT

## 2017-01-11 PROCEDURE — 94760 N-INVAS EAR/PLS OXIMETRY 1: CPT

## 2017-01-11 PROCEDURE — 86900 BLOOD TYPING SEROLOGIC ABO: CPT

## 2017-01-11 RX ORDER — ALBUMIN (HUMAN) 12.5 G/50ML
12.5 SOLUTION INTRAVENOUS ONCE
Status: COMPLETED | OUTPATIENT
Start: 2017-01-11 | End: 2017-01-11

## 2017-01-11 RX ADMIN — ACETAMINOPHEN 1000 MG: 500 TABLET, FILM COATED ORAL at 21:14

## 2017-01-11 RX ADMIN — SODIUM CHLORIDE 500 ML: 9 INJECTION, SOLUTION INTRAVENOUS at 02:29

## 2017-01-11 RX ADMIN — ALBUTEROL SULFATE 2.5 MG: 2.5 SOLUTION RESPIRATORY (INHALATION) at 08:28

## 2017-01-11 RX ADMIN — HEPARIN SODIUM 5000 UNITS: 5000 INJECTION, SOLUTION INTRAVENOUS; SUBCUTANEOUS at 14:38

## 2017-01-11 RX ADMIN — ALVIMOPAN 12 MG: 12 CAPSULE ORAL at 17:24

## 2017-01-11 RX ADMIN — HEPARIN SODIUM 5000 UNITS: 5000 INJECTION, SOLUTION INTRAVENOUS; SUBCUTANEOUS at 05:35

## 2017-01-11 RX ADMIN — LEVOTHYROXINE SODIUM 75 MCG: 75 TABLET ORAL at 05:35

## 2017-01-11 RX ADMIN — ALBUMIN HUMAN 12.5 G: 0.25 SOLUTION INTRAVENOUS at 21:14

## 2017-01-11 RX ADMIN — ACETAMINOPHEN 1000 MG: 500 TABLET, FILM COATED ORAL at 09:17

## 2017-01-11 RX ADMIN — ALBUTEROL SULFATE 2.5 MG: 2.5 SOLUTION RESPIRATORY (INHALATION) at 19:07

## 2017-01-11 RX ADMIN — GABAPENTIN 600 MG: 300 CAPSULE ORAL at 17:24

## 2017-01-11 RX ADMIN — GABAPENTIN 600 MG: 300 CAPSULE ORAL at 09:18

## 2017-01-11 RX ADMIN — ALVIMOPAN 12 MG: 12 CAPSULE ORAL at 09:18

## 2017-01-11 RX ADMIN — ACETAMINOPHEN 1000 MG: 500 TABLET, FILM COATED ORAL at 14:38

## 2017-01-11 RX ADMIN — HEPARIN SODIUM 5000 UNITS: 5000 INJECTION, SOLUTION INTRAVENOUS; SUBCUTANEOUS at 21:14

## 2017-01-11 NOTE — PROGRESS NOTES
Continued Stay Note   [facility]     Patient Name: Janet Truong  MRN: [id number]  Today's Date: 1/11/2017    Admit Date: 1/10/2017          Discharge Plan       01/11/17 1511    Case Management/Social Work Plan    Plan discharge plan    Patient/Family In Agreement With Plan yes    Additional Comments Pt goal is to stay with daughter for a few weeks when medically ready for discharge before going to own home.  Pt requests [facility] if HH needed. Daughter will provide transportation home upon discharge.              Discharge Codes     None            Sona Rahman RN

## 2017-01-11 NOTE — PROGRESS NOTES
Discharge Planning Assessment  [facility]     Patient Name: Janet Truong  MRN: [id number]  Today's Date: 1/11/2017    Admit Date: 1/10/2017          Discharge Needs Assessment       01/11/17 1440    Living Environment    Lives With alone    Living Arrangements house    Provides Primary Care For no one    Quality Of Family Relationships supportive    Able to Return to Prior Living Arrangements yes    Living Arrangement Comments CM spoke with pt in room with permission in regards to discharge planning. Daughter, Giovanna present.  Pt resides in [facility] in a home alone. Pt daughter, Giovanna Rodrigues, lives 5 miles away.  Plans is for pt to stay with daughter for a few weeks when medically ready for discharge.  Pt is independent of ADL's prior to admission.     Discharge Needs Assessment    Readmission Within The Last 30 Days no previous admission in last 30 days    Outpatient/Agency/Support Group Needs other (see comments)   Home oxygen obtained from [facility] [employer].    Anticipated Changes Related to Illness other (see comments)   Unsure of discharge needs at this time. If HH needed, requests [facility].    Equipment Currently Used at Home cane, quad;oxygen;shower chair;respiratory supplies;walker, rolling    Equipment Needed After Discharge cane, quad;oxygen;respiratory supplies;shower chair;walker, rolling    Discharge Facility/Level Of Care Needs home with home health    Transportation Available car    Discharge Disposition still a patient    Discharge Contact Information if Applicable Giovanna Rodrigues(daughter):  [phone]    Discharge Planning Comments Pt has Medicare and  for Life insurance and reports has coverage for presecriptions with a copay.  Pt uses [facility] in [place]. Pt denies difficuly obtaining meds.              Discharge Plan       01/11/17 1511    Case Management/Social Work Plan    Plan discharge plan    Patient/Family In Agreement With  Plan yes    Additional Comments Pt goal is to stay with daughter for a few weeks when medically ready for discharge before going to own home.  Pt requests [facility] if HH needed. Daughter will provide transportation home upon discharge.        Discharge Placement     No information found                Demographic Summary       01/11/17 1436    Primary Care Physician Information    Name Juan Cisneros            Functional Status       01/11/17 1439    Functional Status Prior    Ambulation 0-->independent    Transferring 0-->independent    Toileting 0-->independent    Bathing 0-->independent    Dressing 0-->independent    Eating 0-->independent    Communication 0-->understands/communicates without difficulty    Swallowing 0-->swallows foods/liquids without difficulty            Psychosocial     None            Abuse/Neglect     None            Legal     None            Substance Abuse     None            Patient Forms     None          Sona Rahman RN

## 2017-01-11 NOTE — PROGRESS NOTES
"Colon and Rectal [CSGA]    POD # 1    Visit Vitals   • BP 99/51   • Pulse 66   • Temp 96.7 °F (35.9 °C) (Axillary)   • Resp 16   • Ht 67\" (170.2 cm)   • Wt 178 lb 5.6 oz (80.9 kg)   • SpO2 98%   • BMI 27.93 kg/m2       Lab Results (last 24 hours)     Procedure Component Value Units Date/Time    Tissue Exam [[id number]] Collected:  01/10/17 1352    Specimen:  Tissue from Large Intestine, Right / Ascending Colon Updated:  01/10/17 1513    Calcium, Ionized [[id number]]  (Normal) Collected:  01/11/17 0811    Specimen:  Blood Updated:  01/11/17 0915     Ionized Calcium 1.14 mmol/L     Magnesium [[id number]]  (Normal) Collected:  01/11/17 0811    Specimen:  Blood Updated:  01/11/17 1028     Magnesium 2.3 mg/dL     Phosphorus [[id number]]  (Normal) Collected:  01/11/17 0811    Specimen:  Blood Updated:  01/11/17 1028     Phosphorus 3.9 mg/dL     Basic Metabolic Panel [[id number]]  (Abnormal) Collected:  01/11/17 0811    Specimen:  Blood Updated:  01/11/17 1028     Glucose 80 mg/dL      BUN 8 (L) mg/dL      Creatinine 0.90 mg/dL      Sodium 145 mmol/L      Potassium 3.4 (L) mmol/L      Chloride 111 (H) mmol/L      CO2 28.0 mmol/L      Calcium 7.7 (L) mg/dL      eGFR Non African Amer 60 (L) mL/min/1.73      BUN/Creatinine Ratio 8.9      Anion Gap 6.0 mmol/L     Narrative:       National Kidney Foundation Guidelines    Stage                           Description                             GFR                      1                               Normal or High                          90+  2                               Mild decrease                            60-89  3                               Moderate decrease                   30-59  4                               Severe decrease                       15-29  5                               Kidney failure                             <15    CBC & Differential [[id number]] Collected:  01/11/17 1116    Specimen:  Blood Updated:  01/11/17 1248    Narrative:       The following orders " were created for panel order CBC & Differential.  Procedure                               Abnormality         Status                     ---------                               -----------         ------                     CBC Auto Differential[[id number]]         Abnormal            Final result                 Please view results for these tests on the individual orders.    CBC Auto Differential [[id number]]  (Abnormal) Collected:  01/11/17 1116    Specimen:  Blood Updated:  01/11/17 1248     WBC 6.50 10*3/mm3      RBC 3.38 (L) 10*6/mm3      Hemoglobin 8.5 (L) g/dL      Hematocrit 28.4 (L) %      MCV 84.0 fL      MCH 25.1 (L) pg      MCHC 29.9 (L) g/dL      RDW 18.2 (H) %      RDW-SD 56.3 (H) fl      MPV 8.9 fL      Platelets 234 10*3/mm3      Neutrophil % 68.7 %      Lymphocyte % 19.8 (L) %      Monocyte % 10.2 %      Eosinophil % 0.5 %      Basophil % 0.8 %      Immature Grans % 0.0 %      Neutrophils, Absolute 4.47 10*3/mm3      Lymphocytes, Absolute 1.29 10*3/mm3      Monocytes, Absolute 0.66 10*3/mm3      Eosinophils, Absolute 0.03 (L) 10*3/mm3      Basophils, Absolute 0.05 10*3/mm3      Immature Grans, Absolute 0.00 10*3/mm3           I/O this shift:  In: -   Out: 135 [Urine:135]    Alert and oriented.  No nausea or vomiting.  Good pain control  Good UO.  Same hemoglobin as last fall.  Chronic anemia will follow a.m. hemoglobin.  No flatus or stool yet.  Stable post op course.  Pathology pending.    Order Name Source Comment Collection Info Order Time   OR POTASSIUM   Collected By: Shaniqua Tracy RN 1/10/2017 11:44 AM   TISSUE EXAM Large Intestine, Right / Ascending Colon  Collected By: Hernan Menon MD 1/10/2017  2:20 PM   .    Hernan Menon MD  01/11/17  1:45 PM

## 2017-01-11 NOTE — PROGRESS NOTES
"Adult Nutrition  Assessment/PES    Patient Name:  Janet Truong  YOB: 1935  MRN: [id number]  Admit Date:  1/10/2017    Assessment Date:  1/11/2017        Reason for Assessment       01/11/17 1007    Reason for Assessment    Reason For Assessment/Visit identified at risk by screening criteria    Identified At Risk By Screening Criteria MST SCORE 2+    Time Spent (min) 20    Diagnosis Diagnosis    Cardiac CAD;MI    Endocrine Hypothyroid    Gastrointestinal GERD/Reflux   s/p hemicolectomy    Infectious Disease UTI    Oncology Colon cancer   w/ colon resect    Ortho --   Pt has history of arthritis    Pulmonary/Critical Care COPD    Other diagnosis Pt has history of umbilical hernia repair (1/10)              Nutrition/Diet History       01/11/17 1011    Nutrition/Diet History    Reported/Observed By Patient;Family    Appetite Fair    Other --   Pt and family said that appetite was poor prior to hospital; has been improving, pt ate toast and applesauce at breakfast            Anthropometrics       01/11/17 1013    Anthropometrics    Height 170.2 cm (67\")    Weight 80.9 kg (178 lb 5.6 oz)    Ideal Body Weight (IBW)    Ideal Body Weight (IBW), Female 62.26    % Ideal Body Weight 130.21    Usual Body Weight (UBW)    Usual Body Weight --   Pt reported UBW of 175#    Weight Loss --   Pt reported a wt loss of 3-5 lbs    Weight Loss Time Frame 2 months    Body Mass Index (BMI)    BMI (kg/m2) 27.99            Labs/Tests/Procedures/Meds       01/11/17 1014    Labs/Tests/Procedures/Meds    Labs/Tests Review Reviewed                Nutrition Prescription Ordered       01/11/17 1014    Nutrition Prescription PO    Current PO Diet Full Liquid   w/ crackers and toast            Evaluation of Received Nutrient/Fluid Intake       01/11/17 1014    PO Evaluation    Number of Days PO Intake Evaluated --    Number of Meals 1    % PO Intake 50              Problem/Interventions:        Problem 1       01/11/17 1020    " Nutrition Diagnoses Problem 1    Problem 1 Inadequate Intake/Infusion    Etiology (related to) Medical Diagnosis   Clinical condition    Signs/Symptoms (evidenced by) Report of Mnimal PO Intake                    Intervention Goal       01/11/17 1022    Intervention Goal    General Nutrition support treatment    PO Increase intake            Nutrition Intervention       01/11/17 1022    Nutrition Intervention    RD/Tech Action Encourage intake;Follow Tx progress              Education/Evaluation       01/11/17 1023    Monitor/Evaluation    Monitor Per protocol;PO intake        Comments:      Electronically signed by:  Paige Beard  01/11/17 2:38 PM

## 2017-01-11 NOTE — PLAN OF CARE
Problem: Pain, Acute (Adult)  Goal: Identify Related Risk Factors and Signs and Symptoms  Outcome: Ongoing (interventions implemented as appropriate)  Goal: Acceptable Pain Control/Comfort Level  Outcome: Ongoing (interventions implemented as appropriate)    Problem: Fall Risk (Adult)  Goal: Identify Related Risk Factors and Signs and Symptoms  Outcome: Ongoing (interventions implemented as appropriate)  Goal: Absence of Falls  Outcome: Ongoing (interventions implemented as appropriate)

## 2017-01-12 LAB
ANION GAP SERPL CALCULATED.3IONS-SCNC: 2 MMOL/L (ref 3–11)
BASOPHILS # BLD AUTO: 0.03 10*3/MM3 (ref 0–0.2)
BASOPHILS NFR BLD AUTO: 0.5 % (ref 0–1)
BNP SERPL-MCNC: 116 PG/ML (ref 0–100)
BUN BLD-MCNC: 9 MG/DL (ref 9–23)
BUN/CREAT SERPL: 9 (ref 7–25)
CALCIUM SPEC-SCNC: 8.4 MG/DL (ref 8.7–10.4)
CHLORIDE SERPL-SCNC: 113 MMOL/L (ref 99–109)
CO2 SERPL-SCNC: 26 MMOL/L (ref 20–31)
CREAT BLD-MCNC: 1 MG/DL (ref 0.6–1.3)
DEPRECATED RDW RBC AUTO: 57.7 FL (ref 37–54)
EOSINOPHIL # BLD AUTO: 0.24 10*3/MM3 (ref 0.1–0.3)
EOSINOPHIL NFR BLD AUTO: 4.3 % (ref 0–3)
ERYTHROCYTE [DISTWIDTH] IN BLOOD BY AUTOMATED COUNT: 18.7 % (ref 11.3–14.5)
GFR SERPL CREATININE-BSD FRML MDRD: 53 ML/MIN/1.73
GLUCOSE BLD-MCNC: 84 MG/DL (ref 70–100)
HCT VFR BLD AUTO: 30 % (ref 34.5–44)
HGB BLD-MCNC: 9.2 G/DL (ref 11.5–15.5)
IMM GRANULOCYTES # BLD: 0.01 10*3/MM3 (ref 0–0.03)
IMM GRANULOCYTES NFR BLD: 0.2 % (ref 0–0.6)
LYMPHOCYTES # BLD AUTO: 0.92 10*3/MM3 (ref 0.6–4.8)
LYMPHOCYTES NFR BLD AUTO: 16.4 % (ref 24–44)
MCH RBC QN AUTO: 25.8 PG (ref 27–31)
MCHC RBC AUTO-ENTMCNC: 30.7 G/DL (ref 32–36)
MCV RBC AUTO: 84 FL (ref 80–99)
MONOCYTES # BLD AUTO: 0.51 10*3/MM3 (ref 0–1)
MONOCYTES NFR BLD AUTO: 9.1 % (ref 0–12)
NEUTROPHILS # BLD AUTO: 3.91 10*3/MM3 (ref 1.5–8.3)
NEUTROPHILS NFR BLD AUTO: 69.5 % (ref 41–71)
PLATELET # BLD AUTO: 189 10*3/MM3 (ref 150–450)
PMV BLD AUTO: 8.5 FL (ref 6–12)
POTASSIUM BLD-SCNC: 3.3 MMOL/L (ref 3.5–5.5)
RBC # BLD AUTO: 3.57 10*6/MM3 (ref 3.89–5.14)
SODIUM BLD-SCNC: 141 MMOL/L (ref 132–146)
WBC NRBC COR # BLD: 5.62 10*3/MM3 (ref 3.5–10.8)

## 2017-01-12 PROCEDURE — 83880 ASSAY OF NATRIURETIC PEPTIDE: CPT | Performed by: COLON & RECTAL SURGERY

## 2017-01-12 PROCEDURE — 80048 BASIC METABOLIC PNL TOTAL CA: CPT | Performed by: COLON & RECTAL SURGERY

## 2017-01-12 PROCEDURE — 94640 AIRWAY INHALATION TREATMENT: CPT

## 2017-01-12 PROCEDURE — 85025 COMPLETE CBC W/AUTO DIFF WBC: CPT | Performed by: COLON & RECTAL SURGERY

## 2017-01-12 PROCEDURE — 86900 BLOOD TYPING SEROLOGIC ABO: CPT

## 2017-01-12 PROCEDURE — 36430 TRANSFUSION BLD/BLD COMPNT: CPT

## 2017-01-12 PROCEDURE — 94760 N-INVAS EAR/PLS OXIMETRY 1: CPT

## 2017-01-12 PROCEDURE — P9016 RBC LEUKOCYTES REDUCED: HCPCS

## 2017-01-12 PROCEDURE — 94799 UNLISTED PULMONARY SVC/PX: CPT

## 2017-01-12 PROCEDURE — 25010000002 HEPARIN (PORCINE) PER 1000 UNITS: Performed by: COLON & RECTAL SURGERY

## 2017-01-12 RX ORDER — POTASSIUM CHLORIDE 1.5 G/1.77G
40 POWDER, FOR SOLUTION ORAL AS NEEDED
Status: DISCONTINUED | OUTPATIENT
Start: 2017-01-12 | End: 2017-01-13 | Stop reason: HOSPADM

## 2017-01-12 RX ORDER — POTASSIUM CHLORIDE 750 MG/1
40 CAPSULE, EXTENDED RELEASE ORAL AS NEEDED
Status: DISCONTINUED | OUTPATIENT
Start: 2017-01-12 | End: 2017-01-13 | Stop reason: HOSPADM

## 2017-01-12 RX ADMIN — PROPRANOLOL HYDROCHLORIDE 60 MG: 20 TABLET ORAL at 09:05

## 2017-01-12 RX ADMIN — HEPARIN SODIUM 5000 UNITS: 5000 INJECTION, SOLUTION INTRAVENOUS; SUBCUTANEOUS at 13:09

## 2017-01-12 RX ADMIN — ALVIMOPAN 12 MG: 12 CAPSULE ORAL at 17:29

## 2017-01-12 RX ADMIN — POTASSIUM CHLORIDE 40 MEQ: 750 CAPSULE, EXTENDED RELEASE ORAL at 13:09

## 2017-01-12 RX ADMIN — HEPARIN SODIUM 5000 UNITS: 5000 INJECTION, SOLUTION INTRAVENOUS; SUBCUTANEOUS at 22:06

## 2017-01-12 RX ADMIN — PROPRANOLOL HYDROCHLORIDE 60 MG: 20 TABLET ORAL at 22:06

## 2017-01-12 RX ADMIN — ACETAMINOPHEN 1000 MG: 500 TABLET, FILM COATED ORAL at 22:06

## 2017-01-12 RX ADMIN — POTASSIUM CHLORIDE 40 MEQ: 750 CAPSULE, EXTENDED RELEASE ORAL at 09:04

## 2017-01-12 RX ADMIN — ACETAMINOPHEN 1000 MG: 500 TABLET, FILM COATED ORAL at 09:05

## 2017-01-12 RX ADMIN — HEPARIN SODIUM 5000 UNITS: 5000 INJECTION, SOLUTION INTRAVENOUS; SUBCUTANEOUS at 05:32

## 2017-01-12 RX ADMIN — ALBUTEROL SULFATE 2.5 MG: 2.5 SOLUTION RESPIRATORY (INHALATION) at 20:50

## 2017-01-12 RX ADMIN — ACETAMINOPHEN 1000 MG: 500 TABLET, FILM COATED ORAL at 17:01

## 2017-01-12 RX ADMIN — ALBUTEROL SULFATE 2.5 MG: 2.5 SOLUTION RESPIRATORY (INHALATION) at 08:10

## 2017-01-12 RX ADMIN — LEVOTHYROXINE SODIUM 75 MCG: 75 TABLET ORAL at 05:32

## 2017-01-12 RX ADMIN — ALVIMOPAN 12 MG: 12 CAPSULE ORAL at 09:15

## 2017-01-12 NOTE — PROGRESS NOTES
"Colon and Rectal [CSGA]    POD # 2    Visit Vitals   • /56   • Pulse 69   • Temp 97.9 °F (36.6 °C) (Oral)   • Resp 18   • Ht 67\" (170.2 cm)   • Wt 178 lb 5.6 oz (80.9 kg)   • SpO2 97%   • BMI 27.93 kg/m2       Lab Results (last 24 hours)     Procedure Component Value Units Date/Time    CBC & Differential [[id number]] Collected:  01/12/17 0711    Specimen:  Blood Updated:  01/12/17 0735    Narrative:       The following orders were created for panel order CBC & Differential.  Procedure                               Abnormality         Status                     ---------                               -----------         ------                     CBC Auto Differential[[id number]]         Abnormal            Final result                 Please view results for these tests on the individual orders.    CBC Auto Differential [[id number]]  (Abnormal) Collected:  01/12/17 0711    Specimen:  Blood Updated:  01/12/17 0735     WBC 5.62 10*3/mm3      RBC 3.57 (L) 10*6/mm3      Hemoglobin 9.2 (L) g/dL      Hematocrit 30.0 (L) %      MCV 84.0 fL      MCH 25.8 (L) pg      MCHC 30.7 (L) g/dL      RDW 18.7 (H) %      RDW-SD 57.7 (H) fl      MPV 8.5 fL      Platelets 189 10*3/mm3      Neutrophil % 69.5 %      Lymphocyte % 16.4 (L) %      Monocyte % 9.1 %      Eosinophil % 4.3 (H) %      Basophil % 0.5 %      Immature Grans % 0.2 %      Neutrophils, Absolute 3.91 10*3/mm3      Lymphocytes, Absolute 0.92 10*3/mm3      Monocytes, Absolute 0.51 10*3/mm3      Eosinophils, Absolute 0.24 10*3/mm3      Basophils, Absolute 0.03 10*3/mm3      Immature Grans, Absolute 0.01 10*3/mm3     Basic Metabolic Panel [[id number]]  (Abnormal) Collected:  01/12/17 0711    Specimen:  Blood Updated:  01/12/17 0752     Glucose 84 mg/dL      BUN 9 mg/dL      Creatinine 1.00 mg/dL      Sodium 141 mmol/L      Potassium 3.3 (L) mmol/L      Chloride 113 (H) mmol/L      CO2 26.0 mmol/L      Calcium 8.4 (L) mg/dL      eGFR Non African Amer 53 (L) mL/min/1.73      " BUN/Creatinine Ratio 9.0      Anion Gap 2.0 (L) mmol/L     Narrative:       National Kidney Foundation Guidelines    Stage                           Description                             GFR                      1                               Normal or High                          90+  2                               Mild decrease                            60-89  3                               Moderate decrease                   30-59  4                               Severe decrease                       15-29  5                               Kidney failure                             <15    BNP [[id number]]  (Abnormal) Collected:  01/12/17 0711    Specimen:  Blood Updated:  01/12/17 0754     .0 (H) pg/mL           I/O this shift:  In: 600 [P.O.:600]  Out: 751 [Urine:750; Stool:1]    Alert and oriented.  No nausea or vomiting.  Good pain control  Good UO.   potassium 3.3 hemoglobin 9.2  Positive flatus and stool  Stable post op course.  Better blood pressure and urine output.  Still weak on her feet.  Pathology pending.  Home Friday.  Order Name Source Comment Collection Info Order Time   OR POTASSIUM   Collected By: Shaniqua Tracy RN 1/10/2017 11:44 AM   TISSUE EXAM Large Intestine, Right / Ascending Colon  Collected By: Hernan Menon MD 1/10/2017  2:20 PM   .    Hernan Menon MD  01/12/17  6:57 PM

## 2017-01-12 NOTE — PROGRESS NOTES
Low urine output with flat neck veins and dry skin.  Neuroma was 85 earlier and she's been getting a lot of blood on going to give her some albumin and a transfusion and increase her IV rate.  Oxygen saturations are in upper 90s.  She arouses easily.

## 2017-01-13 VITALS
WEIGHT: 178.38 LBS | OXYGEN SATURATION: 97 % | BODY MASS INDEX: 28 KG/M2 | TEMPERATURE: 97.8 F | RESPIRATION RATE: 16 BRPM | HEART RATE: 77 BPM | DIASTOLIC BLOOD PRESSURE: 54 MMHG | HEIGHT: 67 IN | SYSTOLIC BLOOD PRESSURE: 117 MMHG

## 2017-01-13 PROBLEM — C18.9 COLON CANCER (HCC): Status: RESOLVED | Noted: 2017-01-10 | Resolved: 2017-01-13

## 2017-01-13 LAB
ABO + RH BLD: NORMAL
ANION GAP SERPL CALCULATED.3IONS-SCNC: 4 MMOL/L (ref 3–11)
BH BB BLOOD EXPIRATION DATE: NORMAL
BH BB BLOOD TYPE BARCODE: 6200
BH BB DISPENSE STATUS: NORMAL
BH BB PRODUCT CODE: NORMAL
BH BB UNIT NUMBER: NORMAL
BUN BLD-MCNC: 9 MG/DL (ref 9–23)
BUN/CREAT SERPL: 9 (ref 7–25)
CALCIUM SPEC-SCNC: 9 MG/DL (ref 8.7–10.4)
CHLORIDE SERPL-SCNC: 113 MMOL/L (ref 99–109)
CO2 SERPL-SCNC: 26 MMOL/L (ref 20–31)
CREAT BLD-MCNC: 1 MG/DL (ref 0.6–1.3)
CROSSMATCH INTERPRETATION: NORMAL
CYTO UR: NORMAL
GFR SERPL CREATININE-BSD FRML MDRD: 53 ML/MIN/1.73
GLUCOSE BLD-MCNC: 87 MG/DL (ref 70–100)
HCT VFR BLD AUTO: 31.3 % (ref 34.5–44)
HGB BLD-MCNC: 9.5 G/DL (ref 11.5–15.5)
LAB AP CASE REPORT: NORMAL
LAB AP CLINICAL INFORMATION: NORMAL
Lab: NORMAL
PATH REPORT.FINAL DX SPEC: NORMAL
PATH REPORT.GROSS SPEC: NORMAL
POTASSIUM BLD-SCNC: 4.1 MMOL/L (ref 3.5–5.5)
SODIUM BLD-SCNC: 143 MMOL/L (ref 132–146)
UNIT  ABO: NORMAL
UNIT  RH: NORMAL

## 2017-01-13 PROCEDURE — 94640 AIRWAY INHALATION TREATMENT: CPT

## 2017-01-13 PROCEDURE — 85014 HEMATOCRIT: CPT | Performed by: COLON & RECTAL SURGERY

## 2017-01-13 PROCEDURE — 85018 HEMOGLOBIN: CPT | Performed by: COLON & RECTAL SURGERY

## 2017-01-13 PROCEDURE — 80048 BASIC METABOLIC PNL TOTAL CA: CPT | Performed by: COLON & RECTAL SURGERY

## 2017-01-13 PROCEDURE — 25010000002 HEPARIN (PORCINE) PER 1000 UNITS: Performed by: COLON & RECTAL SURGERY

## 2017-01-13 RX ORDER — FLUCONAZOLE 100 MG/1
100 TABLET ORAL DAILY
Qty: 4 TABLET | Refills: 0 | Status: SHIPPED | OUTPATIENT
Start: 2017-01-13 | End: 2017-01-23

## 2017-01-13 RX ADMIN — LEVOTHYROXINE SODIUM 75 MCG: 75 TABLET ORAL at 05:39

## 2017-01-13 RX ADMIN — ALBUTEROL SULFATE 2.5 MG: 2.5 SOLUTION RESPIRATORY (INHALATION) at 07:38

## 2017-01-13 RX ADMIN — HEPARIN SODIUM 5000 UNITS: 5000 INJECTION, SOLUTION INTRAVENOUS; SUBCUTANEOUS at 05:39

## 2017-01-13 RX ADMIN — ACETAMINOPHEN 1000 MG: 500 TABLET, FILM COATED ORAL at 08:39

## 2017-01-13 RX ADMIN — PROPRANOLOL HYDROCHLORIDE 60 MG: 20 TABLET ORAL at 08:39

## 2017-01-13 NOTE — DISCHARGE PLACEMENT REQUEST
"Era Truong (81 y.o. Female)     To [facility]     From [facility]() [phone]        Date of Birth Social Security Number Address Home Phone MRN    1935  [address] [place], [place] [id number] [phone] [id number]    Confucianism Marital Status          Unknown Single       Admission Date Admission Type Admitting Provider Attending Provider Department, Room/Bed    1/10/17 Elective Hernan Menon MD Svetich, David J, MD [facility] 5G, S570/1    Discharge Date Discharge Disposition Discharge Destination         Home or Self Care             Attending Provider: Hernan Menon MD     Allergies:  Phenobarbital    Isolation:  None   Infection:  None   Code Status:  FULL    Ht:  67\" (170.2 cm)   Wt:  178 lb 6 oz (80.9 kg)    Admission Cmt:  None   Principal Problem:  None                Active Insurance as of 1/10/2017     Primary Coverage     Payor Plan Insurance Group Employer/Plan Group    MEDICARE MEDICARE A & B      Payor Plan Address Payor Plan Phone Number Effective From Effective To    [address] [phone] 3/1/2000     [place] [address]       Subscriber Name Subscriber Birth Date Member ID       ERA TRUONG 1935 [id number]           Secondary Coverage     Payor Plan Insurance Group Employer/Plan Group     FOR LIFE  FOR LIFE MC SUPP      Payor Plan Address Payor Plan Phone Number Effective From Effective To    [address] [phone] 11/29/2016     [place] [address]       Subscriber Name Subscriber Birth Date Member ID       ERA TRUONG 1935 [id number]                 Emergency Contacts      (Rel.) Home Phone Work Phone Mobile Phone    Giovanna Rodrigues (Daughter) [phone] -- --               Discharge Summary      Hernan Menon MD at 1/13/2017  3:12 PM          Colon and Rectal [CSGA]    Date of Discharge:  1/13/2017    Discharge Diagnosis:   Right colon cancer and hepatic flexure polyp " "pathology pending    Problem List:  Yeast infection of the groin.  Unsteady on her feet.    Presenting Problem/History of Present Illness  Colon cancer [C18.9]      Hospital Course  Patient is a 81 y.o. female presented with anemia and colonoscopy showed multiple colon polyps including a small cancer near the cecum warranting resection.  The right colon and part of the proximal transverse colon was removed and a primary anastomosis carried out.  She received 1 unit of blood postop for her chronic anemia.  Potassium was replaced and is now normal.  GI function returned uneventfully.  She has been unsteady on her feet and will require home health training.  Follow-up in the office in one week for wound evaluation and to go over the pathology.  She is very sensitive to narcotics of any kind so she will go home simply on Tylenol.   She developed a yeast infection in her groin and will go home on Diflucan.  Procedures Performed  Procedure(s):  EXTENDED RIGHT HEMICOLECTOMY UMBILICAL HERNIA REPAIR       Consults:   Consults     No orders found from 12/12/2016 to 1/11/2017.        Condition on Discharge: Good    Vital Signs  Blood pressure 117/54, pulse 77, temperature 97.8 °F (36.6 °C), temperature source Oral, resp. rate 16, height 67\" (170.2 cm), weight 178 lb 6 oz (80.9 kg), SpO2 97 %..    Discharge Disposition  Home or Self Care    Discharge Medications   Janet Truong   Home Medication Instructions KATE:[id number]    Printed on:01/13/17 7094   Medication Information                      albuterol (PROVENTIL) (2.5 MG/3ML) 0.083% nebulizer solution  Take 2.5 mg by nebulization 2 (Two) Times a Day.             ferrous sulfate 325 (65 FE) MG tablet  Take 325 mg by mouth 2 (Two) Times a Day.             fluconazole (DIFLUCAN) 100 MG tablet  Take 1 tablet by mouth Daily for 10 days.             levothyroxine (SYNTHROID, LEVOTHROID) 75 MCG tablet  Take 75 mcg by mouth Daily.             Multiple Vitamins-Minerals " (I-KHLOE) tablet tablet  Take 1 tablet by mouth Daily.             pantoprazole (PROTONIX) 40 MG EC tablet  Take 40 mg by mouth 2 (Two) Times a Day.             primidone (MYSOLINE) 50 MG tablet  Take 50 mg by mouth 2 (Two) Times a Day.             propranolol (INDERAL) 60 MG tablet  Take 60 mg by mouth 2 (Two) Times a Day.             tiotropium (SPIRIVA) 18 MCG per inhalation capsule  Place 2 capsules into inhaler and inhale Daily.                 Discharge Diet as tolerated and fluids encouraged.      Activity at Discharge daily ambulation encouraged.      Follow-up Appointments 1 week  No future appointments.      Test Results Pending at Discharge pathology   Order Current Status    Tissue Exam In process             Electronically signed by Hernan Menon MD at 1/13/2017  3:17 PM

## 2017-01-13 NOTE — PROGRESS NOTES
Continued Stay Note  [facility]     Patient Name: Janet Truong  MRN: [id number]  Today's Date: 1/13/2017    Admit Date: 1/10/2017          Discharge Plan       01/13/17 1527    Case Management/Social Work Plan    Plan discharge plan    Patient/Family In Agreement With Plan yes    Additional Comments Pt medically ready for discharge today, 1/13.  Referral made to [facility]([phone]) per pt and family request.  Spoke with Anna() at [facility](([phone]) and she accepted referral. Clinical information including orders faxed to [facility] at [phone]). Anna  at [facility] is aware pt medically ready for discharge today, 1/13 and pt will be staying at daughter's(Giovanna Rodrigues's) house. Provided HH with Giovanna's address at [address] [place] [address] and andrew number([phone]). Daughter aware HH will be in contact with her.  Daughter providing transportation home upon discharge.               Discharge Codes     None        Expected Discharge Date and Time     Expected Discharge Date Expected Discharge Time    Jan 13, 2017             Sona Rahman, RN

## 2017-01-13 NOTE — DISCHARGE PLACEMENT REQUEST
"Janet Truong (81 y.o. Female)     To [facility]    From [facility]) [phone]          [address]  [address] [facility] [address]  Phone: [phone]  Fax:         Patient:     Janet Truong MRN: [id number]   [address] [address] : 1935  SSN:    Phone: [phone] Sex: F      INSURANCE PAYOR PLAN GROUP # SUBSCRIBER ID   Primary:  Secondary: MEDICARE   FOR LIFE [id number]  [id number]   [id number]  [id number]   Admitting Diagnosis: Colon cancer [C18.9]  Order Date: 2017               Inpatient Consult to Case Management     (Order ID: [id number])   Diagnosis:       Priority: Routine Expected Date:   Expiration Date:        Interval:  Count:    Reason for Consult? Arrange HH for skilled nursing OT and nursing aid     Specimen Type:   Specimen Source:   Specimen Taken Date:   Specimen Taken Time:         Electronically Signed by: Hernan Menon MD 2017 2:31 PM                     Electronically signed by: Hernan Menon MD (NPI: [id number])             Date of Birth Social Security Number Address Home Phone MRN    1935  [address] [place], [place] [address] [facility] [address] [phone] [id number]    [employer] Marital Status          Unknown Single       Admission Date Admission Type Admitting Provider Attending Provider Department, Room/Bed    1/10/17 Elective Hernan Menon MD Svetich, David J, MD [address], S570/1    Discharge Date Discharge Disposition Discharge Destination                      Attending Provider: Hernan Menon MD     Allergies:  Phenobarbital    Isolation:  None   Infection:  None   Code Status:  FULL    Ht:  67\" (170.2 cm)   Wt:  178 lb 6 oz (80.9 kg)    Admission Cmt:  None   Principal Problem:  None                Active Insurance as of 1/10/2017     Primary Coverage     Payor Plan Insurance Group Employer/Plan Group    MEDICARE MEDICARE A & B  " "    Payor Plan Address Payor Plan Phone Number Effective From Effective To    [address] [phone] 3/1/2000     [place] [address]       Subscriber Name Subscriber Birth Date Member ID       ERA CORTEZ 1935 [id number]           Secondary Coverage     Payor Plan Insurance Group Employer/Plan Group     FOR LIFE  FOR LIFE MC SUPP      Payor Plan Address Payor Plan Phone Number Effective From Effective To    [address] [phone] 11/29/2016     [place] [address]       Subscriber Name Subscriber Birth Date Member ID       ERA CORTEZ 1935 [id number]                 Emergency Contacts      (Rel.) Home Phone Work Phone Mobile Phone    Giovanna Rodrigues (Daughter) [phone] -- --               History & Physical      H&P filed by Africa Ann MD at 1/11/2017 12:37 PM      Scan on 1/10/2017 : [facility] OFFICE NOTE UPDATED  H/P 1/10/17 (below)              Electronically signed by Interface, Scans Incoming at 1/11/2017 12:37 PM      EDUARDO Callaway at 1/10/2017 12:00 PM          [facility] Pre-op    Full history and physical note from office is up to date.  See paper copy on chart.    Visit Vitals   • /63 (BP Location: Right arm, Patient Position: Lying)   • Pulse 66   • Temp 98.3 °F (36.8 °C) (Temporal Artery )   • Resp 18   • Ht 67\" (170.2 cm)   • Wt 171 lb (77.6 kg)   • SpO2 94%   • BMI 26.78 kg/m2       IMM:  Influenza:2016  Pneumococcal: UTD  Tetanus: Unknown    EDUARDO Callaway 1/10/2017 12:00 PM   [facility] Pre-op       Electronically signed by Hernan Menon MD at 1/12/2017  7:24 PM        Hospital Medications (active)       Dose Frequency Start End    acetaminophen (TYLENOL) tablet 1,000 mg 1,000 mg 3 Times Daily 1/10/2017     Sig - Route: Take 2 tablets by mouth 3 (Three) Times a Day. - Oral    albuterol (PROVENTIL) nebulizer solution 2.5 mg 2.5 mg 2 Times Daily - RT 1/10/2017     Sig - Route: Take 0.5 mL by nebulization 2 (Two) Times a Day. - " Nebulization    heparin (porcine) 5000 UNIT/ML injection 5,000 Units 5,000 Units Every 8 Hours Scheduled 1/10/2017     Sig - Route: Inject 1 mL under the skin Every 8 (Eight) Hours. - Subcutaneous    levothyroxine (SYNTHROID, LEVOTHROID) tablet 75 mcg 75 mcg Every Early Morning 1/11/2017     Sig - Route: Take 1 tablet by mouth Every Morning. - Oral    ondansetron (ZOFRAN) injection 4 mg 4 mg Every 6 Hours PRN 1/10/2017     Sig - Route: Infuse 2 mL into a venous catheter Every 6 (Six) Hours As Needed for nausea or vomiting. - Intravenous    potassium chloride (KLOR-CON) packet 40 mEq 40 mEq As Needed 1/12/2017     Sig - Route: Take 40 mEq by mouth As Needed (potassium replacement, see admin instructions). - Oral    potassium chloride (MICRO-K) CR capsule 40 mEq 40 mEq As Needed 1/12/2017     Sig - Route: Take 4 capsules by mouth As Needed (potassium replacement.  see admin instructions). - Oral    propranolol (INDERAL) tablet 60 mg 60 mg Every 12 Hours Scheduled 1/11/2017     Sig - Route: Take 3 tablets by mouth Every 12 (Twelve) Hours. - Oral    alvimopan (ENTEREG) capsule 12 mg (Discontinued) 12 mg 2 Times Daily 1/11/2017 1/12/2017    Sig - Route: Take 1 capsule by mouth 2 (Two) Times a Day. - Oral    diazePAM (VALIUM) injection 2.5 mg (Discontinued) 2.5 mg Every 6 Hours PRN 1/10/2017 1/12/2017    Sig - Route: Infuse 0.5 mL into a venous catheter Every 6 (Six) Hours As Needed for muscle spasms (abdominal muscle spasms). - Intravenous    diazePAM (VALIUM) tablet 5 mg (Discontinued) 5 mg Every 6 Hours PRN 1/10/2017 1/12/2017    Sig - Route: Take 1 tablet by mouth Every 6 (Six) Hours As Needed for muscle spasms (Abdominal muscle spasms). - Oral    gabapentin (NEURONTIN) capsule 600 mg (Discontinued) 600 mg 2 Times Daily 1/10/2017 1/12/2017    Sig - Route: Take 2 capsules by mouth 2 (Two) Times a Day. - Oral    HYDROcodone-acetaminophen (NORCO) 5-325 MG per tablet 1 tablet (Discontinued) 1 tablet Every 4 Hours PRN  "1/10/2017 1/12/2017    Sig - Route: Take 1 tablet by mouth Every 4 (Four) Hours As Needed for moderate pain (4-6). - Oral    Morphine sulfate (PF) injection 2 mg (Discontinued) 2 mg Every 1 Hour PRN 1/10/2017 1/12/2017    Sig - Route: Infuse 1 mL into a venous catheter Every 1 (One) Hour As Needed for moderate pain (4-6). - Intravenous    Linked Group 1:  \"And\" Linked Group Details        naloxone (NARCAN) injection 0.4 mg (Discontinued) 0.4 mg Every 5 Minutes PRN 1/10/2017 1/12/2017    Sig - Route: Infuse 1 mL into a venous catheter Every 5 (Five) Minutes As Needed for respiratory depression. - Intravenous    Linked Group 1:  \"And\" Linked Group Details        Scopolamine (TRANSDERM-SCOP) 1.5 MG/3DAYS patch 1 patch (Discontinued) 1 patch Once 1/10/2017 1/12/2017    Sig - Route: Place 1 patch on the skin 1 (One) Time. - Transdermal          "

## 2017-01-13 NOTE — DISCHARGE SUMMARY
"Colon and Rectal [[facility]]    Date of Discharge:  1/13/2017    Discharge Diagnosis:   Right colon cancer and hepatic flexure polyp pathology pending    Problem List:  Yeast infection of the groin.  Unsteady on her feet.    Presenting Problem/History of Present Illness  Colon cancer [C18.9]      Hospital Course  Patient is a 81 y.o. female presented with anemia and colonoscopy showed multiple colon polyps including a small cancer near the cecum warranting resection.  The right colon and part of the proximal transverse colon was removed and a primary anastomosis carried out.  She received 1 unit of blood postop for her chronic anemia.  Potassium was replaced and is now normal.  GI function returned uneventfully.  She has been unsteady on her feet and will require home health training.  Follow-up in the office in one week for wound evaluation and to go over the pathology.  She is very sensitive to narcotics of any kind so she will go home simply on Tylenol.   She developed a yeast infection in her groin and will go home on Diflucan.  Procedures Performed  Procedure(s):  EXTENDED RIGHT HEMICOLECTOMY UMBILICAL HERNIA REPAIR       Consults:   Consults     No orders found from 12/12/2016 to 1/11/2017.        Condition on Discharge: Good    Vital Signs  Blood pressure 117/54, pulse 77, temperature 97.8 °F (36.6 °C), temperature source Oral, resp. rate 16, height 67\" (170.2 cm), weight 178 lb 6 oz (80.9 kg), SpO2 97 %..    Discharge Disposition  Home or Self Care    Discharge Medications   Janet Truong   Home Medication Instructions KATE:[id number]    Printed on:01/13/17 1513   Medication Information                      albuterol (PROVENTIL) (2.5 MG/3ML) 0.083% nebulizer solution  Take 2.5 mg by nebulization 2 (Two) Times a Day.             ferrous sulfate 325 (65 FE) MG tablet  Take 325 mg by mouth 2 (Two) Times a Day.             fluconazole (DIFLUCAN) 100 MG tablet  Take 1 tablet by mouth Daily for 10 days.          "    levothyroxine (SYNTHROID, LEVOTHROID) 75 MCG tablet  Take 75 mcg by mouth Daily.             Multiple Vitamins-Minerals (I-KHLOE) tablet tablet  Take 1 tablet by mouth Daily.             pantoprazole (PROTONIX) 40 MG EC tablet  Take 40 mg by mouth 2 (Two) Times a Day.             primidone (MYSOLINE) 50 MG tablet  Take 50 mg by mouth 2 (Two) Times a Day.             propranolol (INDERAL) 60 MG tablet  Take 60 mg by mouth 2 (Two) Times a Day.             tiotropium (SPIRIVA) 18 MCG per inhalation capsule  Place 2 capsules into inhaler and inhale Daily.                 Discharge Diet as tolerated and fluids encouraged.      Activity at Discharge daily ambulation encouraged.      Follow-up Appointments 1 week  No future appointments.      Test Results Pending at Discharge pathology   Order Current Status    Tissue Exam In process

## 2017-01-13 NOTE — PLAN OF CARE
Problem: Fall Risk (Adult)  Goal: Identify Related Risk Factors and Signs and Symptoms    01/13/17 0032   Fall Risk   Fall Risk: Related Risk Factors age-related changes;bladder function altered;fear of falling;gait/mobility problems;history of falls;inadequate lighting;impaired vision;objects hard to reach;slipper/uneven surfaces;environment unfamiliar   Fall Risk: Signs and Symptoms presence of risk factors

## 2017-05-15 ENCOUNTER — TELEPHONE (OUTPATIENT)
Dept: GASTROENTEROLOGY | Facility: CLINIC | Age: 82
End: 2017-05-15

## 2017-05-15 DIAGNOSIS — Z12.11 COLON CANCER SCREENING: Primary | ICD-10-CM

## 2017-06-08 ENCOUNTER — ANESTHESIA EVENT (OUTPATIENT)
Dept: PERIOP | Facility: HOSPITAL | Age: 82
End: 2017-06-08

## 2017-06-08 ENCOUNTER — ANESTHESIA (OUTPATIENT)
Dept: PERIOP | Facility: HOSPITAL | Age: 82
End: 2017-06-08

## 2017-06-08 ENCOUNTER — HOSPITAL ENCOUNTER (OUTPATIENT)
Facility: HOSPITAL | Age: 82
Setting detail: HOSPITAL OUTPATIENT SURGERY
Discharge: HOME | End: 2017-06-08
Attending: INTERNAL MEDICINE | Admitting: INTERNAL MEDICINE

## 2017-06-08 VITALS
WEIGHT: 165 LBS | SYSTOLIC BLOOD PRESSURE: 128 MMHG | DIASTOLIC BLOOD PRESSURE: 70 MMHG | BODY MASS INDEX: 25.01 KG/M2 | RESPIRATION RATE: 18 BRPM | TEMPERATURE: 98 F | HEIGHT: 68 IN | OXYGEN SATURATION: 97 % | HEART RATE: 70 BPM

## 2017-06-08 DIAGNOSIS — Z12.11 COLON CANCER SCREENING: ICD-10-CM

## 2017-06-08 LAB
027 TOXIN: (no result)
C DIFF TOX GENS STL QL NAA+PROBE: NEGATIVE

## 2017-06-08 PROCEDURE — 87493 C DIFF AMPLIFIED PROBE: CPT | Performed by: INTERNAL MEDICINE

## 2017-06-08 PROCEDURE — 87209 SMEAR COMPLEX STAIN: CPT | Performed by: INTERNAL MEDICINE

## 2017-06-08 PROCEDURE — 25010000002 PROPOFOL 10 MG/ML EMULSION: Performed by: NURSE ANESTHETIST, CERTIFIED REGISTERED

## 2017-06-08 PROCEDURE — 88305 TISSUE EXAM BY PATHOLOGIST: CPT | Performed by: INTERNAL MEDICINE

## 2017-06-08 PROCEDURE — 87177 OVA AND PARASITES SMEARS: CPT | Performed by: INTERNAL MEDICINE

## 2017-06-08 PROCEDURE — 45385 COLONOSCOPY W/LESION REMOVAL: CPT | Performed by: INTERNAL MEDICINE

## 2017-06-08 RX ORDER — LIDOCAINE HYDROCHLORIDE 20 MG/ML
INJECTION, SOLUTION EPIDURAL; INFILTRATION; INTRACAUDAL; PERINEURAL AS NEEDED
Status: DISCONTINUED | OUTPATIENT
Start: 2017-06-08 | End: 2017-06-08 | Stop reason: SURG

## 2017-06-08 RX ORDER — MONTELUKAST SODIUM 4 MG/1
1 TABLET, CHEWABLE ORAL 2 TIMES DAILY
Qty: 60 TABLET | Refills: 5 | Status: SHIPPED | OUTPATIENT
Start: 2017-06-08

## 2017-06-08 RX ORDER — PROPOFOL 10 MG/ML
VIAL (ML) INTRAVENOUS AS NEEDED
Status: DISCONTINUED | OUTPATIENT
Start: 2017-06-08 | End: 2017-06-08 | Stop reason: SURG

## 2017-06-08 RX ORDER — OXYCODONE HYDROCHLORIDE AND ACETAMINOPHEN 5; 325 MG/1; MG/1
1 TABLET ORAL ONCE AS NEEDED
Status: DISCONTINUED | OUTPATIENT
Start: 2017-06-08 | End: 2017-06-08 | Stop reason: HOSPADM

## 2017-06-08 RX ORDER — FENTANYL CITRATE 50 UG/ML
50 INJECTION, SOLUTION INTRAMUSCULAR; INTRAVENOUS
Status: DISCONTINUED | OUTPATIENT
Start: 2017-06-08 | End: 2017-06-08 | Stop reason: HOSPADM

## 2017-06-08 RX ORDER — MEPERIDINE HYDROCHLORIDE 25 MG/ML
12.5 INJECTION INTRAMUSCULAR; INTRAVENOUS; SUBCUTANEOUS
Status: DISCONTINUED | OUTPATIENT
Start: 2017-06-08 | End: 2017-06-08 | Stop reason: HOSPADM

## 2017-06-08 RX ORDER — ONDANSETRON 2 MG/ML
4 INJECTION INTRAMUSCULAR; INTRAVENOUS ONCE AS NEEDED
Status: DISCONTINUED | OUTPATIENT
Start: 2017-06-08 | End: 2017-06-08 | Stop reason: HOSPADM

## 2017-06-08 RX ORDER — IPRATROPIUM BROMIDE AND ALBUTEROL SULFATE 2.5; .5 MG/3ML; MG/3ML
3 SOLUTION RESPIRATORY (INHALATION) ONCE AS NEEDED
Status: DISCONTINUED | OUTPATIENT
Start: 2017-06-08 | End: 2017-06-08 | Stop reason: HOSPADM

## 2017-06-08 RX ORDER — SODIUM CHLORIDE, SODIUM LACTATE, POTASSIUM CHLORIDE, CALCIUM CHLORIDE 600; 310; 30; 20 MG/100ML; MG/100ML; MG/100ML; MG/100ML
INJECTION, SOLUTION INTRAVENOUS CONTINUOUS PRN
Status: DISCONTINUED | OUTPATIENT
Start: 2017-06-08 | End: 2017-06-08 | Stop reason: SURG

## 2017-06-08 RX ADMIN — PROPOFOL 30 MG: 10 INJECTION, EMULSION INTRAVENOUS at 08:28

## 2017-06-08 RX ADMIN — LIDOCAINE HYDROCHLORIDE 60 MG: 20 INJECTION, SOLUTION EPIDURAL; INFILTRATION; INTRACAUDAL; PERINEURAL at 08:28

## 2017-06-08 RX ADMIN — SODIUM CHLORIDE, POTASSIUM CHLORIDE, SODIUM LACTATE AND CALCIUM CHLORIDE: 600; 310; 30; 20 INJECTION, SOLUTION INTRAVENOUS at 08:24

## 2017-06-08 RX ADMIN — PROPOFOL 30 MG: 10 INJECTION, EMULSION INTRAVENOUS at 08:41

## 2017-06-08 RX ADMIN — PROPOFOL 30 MG: 10 INJECTION, EMULSION INTRAVENOUS at 08:37

## 2017-06-08 RX ADMIN — PROPOFOL 30 MG: 10 INJECTION, EMULSION INTRAVENOUS at 08:32

## 2017-06-08 NOTE — ANESTHESIA PREPROCEDURE EVALUATION
Anesthesia Evaluation     Patient summary reviewed and Nursing notes reviewed   no history of anesthetic complications:  NPO Solid Status: > 8 hours  NPO Liquid Status: > 8 hours     Airway   Mallampati: II  TM distance: >3 FB  Neck ROM: full  Dental      Pulmonary - normal exam   (+) COPD,   Cardiovascular - normal exam    (+) past MI , CAD (ef 60% echo 11/16), cardiac stents more than 12 months ago       Neuro/Psych- negative ROS  GI/Hepatic/Renal/Endo    (+)  GERD, hypothyroidism,     Musculoskeletal     Abdominal    Substance History      OB/GYN          Other   (+) arthritis                                     Anesthesia Plan    ASA 3     general     intravenous induction   Anesthetic plan and risks discussed with patient.

## 2017-06-08 NOTE — OP NOTE
06/08/17    COLONOSCOPY FOR SCREENING with polypectomy and stool aspirate  Procedure Note    Janet Truong  6/8/2017    Pre-op Diagnosis: History of multiple colonic polyps, diarrhea, colon cancer screening, last colonoscopy was performed about 6 months ago      Post-op Diagnosis:   Colonic polyps, described below  Internal hemorrhoids  Status post right colectomy        Anesthesia: Per Anesthesia service, general anesthesia      Estimated Blood Loss: Negligible      Findings: Normal rectal examination.  Colonoscopy was performed.  Bowel preparation was adequate.  The scope was retroflexed within the rectum.  All areas were examined carefully She is status post right colectomy--the anastomosis was visualized.  A total of 6 small (all under 1 cm in size) benign-appearing sessile polyps were scattered throughout the colon--all of the polyps were removed using the cold snare.  Internal hemorrhoids were noted.  No other abnormality was seen.  Stool sample was collected for ova and parasites, Clostridium difficile toxin.  She tolerated the procedure well and there were no complications.  She left the OR in stable and satisfactory condition.      Complications: None      Recommendations:   Findings discussed with the patient  Await findings of biopsies and stool studies  Repeat screening/surveillance colonoscopy in 1-2 years      Sukhdeep Pierre III, MD     Date: 6/8/2017  Time: 8:50 AM     CC:  Dr. Hernan Cisneros

## 2017-06-08 NOTE — ANESTHESIA POSTPROCEDURE EVALUATION
Patient: Janet Truong    Procedure Summary     Date Anesthesia Start Anesthesia Stop Room / Location    06/08/17 0824 0852 [facility] / [facility]       Procedure Diagnosis Surgeon Provider    COLONOSCOPY FOR SCREENING  CPTCODE:66895 (N/A ) Colon cancer screening  (Colon cancer screening [Z12.11]) MD Chi Segura III, MD          Anesthesia Type: general  Last vitals  /63 (06/08/17 0924)    Temp 98 °F (36.7 °C) (06/08/17 0854)    Pulse 69 (06/08/17 0924)   Resp 18 (06/08/17 0924)    SpO2 97 % (06/08/17 0924)      Post Anesthesia Care and Evaluation    Patient location during evaluation: PHASE II  Patient participation: complete - patient participated  Level of consciousness: awake and alert  Pain score: 1  Pain management: adequate  Airway patency: patent  Anesthetic complications: No anesthetic complications  PONV Status: controlled  Cardiovascular status: acceptable  Respiratory status: acceptable  Hydration status: acceptable

## 2017-06-08 NOTE — H&P
History and physical examination  June 8, 2017    HPI  82-year-old white female presents for screening/surveillance colonoscopy.  She has history of having multiple colonic polyps removed.  She reports recurrent diarrhea.      Review of Systems   Negative and noncontributory.    ACTIVE PROBLEMS:   Specialty Problems     None          PAST MEDICAL HISTORY:  Past Medical History:   Diagnosis Date   • Arthritis    • CHF (congestive heart failure)     x 1 time; she has never been placed in the hospital for heart failure   • Colon cancer 1/10/2017   • COPD (chronic obstructive pulmonary disease)     2 LITERS AT NIGHT AND PRN    • Coronary artery disease     3 CARDIAC STENTS 2001   • Essential tremor    • Essential tremor    • GERD (gastroesophageal reflux disease)    • Hypothyroid    • MI (myocardial infarction) 2001   • Wears dentures     UPPER AND LOWER   • Wears reading eyeglasses        SURGICAL HISTORY:  Past Surgical History:   Procedure Laterality Date   • APPENDECTOMY     • CHOLECYSTECTOMY     • COLON RESECTION N/A 1/10/2017    Procedure: EXTENDED RIGHT HEMICOLECTOMY UMBILICAL HERNIA REPAIR;  Surgeon: Hernan Menon MD;  Location:  [facility];  Service:    • COLONOSCOPY N/A 12/2/2016    Procedure: COLONOSCOPY;  Surgeon: Sukhdeep Pierre III, MD;  Location:  [facility];  Service:    • CORONARY ANGIOPLASTY WITH STENT PLACEMENT  2001    x 3   • ENDOSCOPY N/A 12/2/2016    Procedure: ESOPHAGOGASTRODUODENOSCOPY;  Surgeon: Sukhdeep Pierre III, MD;  Location:  [facility];  Service:    • HYSTERECTOMY         FAMILY HISTORY:  Family History   Problem Relation Age of Onset   • Colon cancer Brother    • Prostate cancer Brother    • Heart attack Brother    • Heart disease Brother      MI at less than 60   • Cancer Brother      colon cancer in 2 brothers and prostate cancer and one brother   • Heart attack Other    • Colon cancer Other    • Heart failure Mother    • Heart disease Mother      CHF   • Heart attack Father    •  "Prostate cancer Father    • Cancer Father      Prostate   • Heart disease Father      unknown what kind   • Heart attack Sister    • Heart disease Sister      MI at less than 60   • Hypertension Daughter    • Arthritis Daughter      due to lupus   • Cancer Daughter      Thyroid CA   • Hypertension Son    • Cancer Paternal Grandmother      Throat and neck CA   • Cancer Paternal Grandfather      Stomach CA       SOCIAL HISTORY:  Social History   Substance Use Topics   • Smoking status: Former Smoker     Packs/day: 2.00     Years: 50.00     Types: Cigarettes     Start date: 1/1/1950     Quit date: 1/29/2001   • Smokeless tobacco: Never Used   • Alcohol use No       CURRENT MEDICATION:  No current facility-administered medications for this encounter.      I have reviewed her list of current medications.    ALLERGIES:  Phenobarbital    VISIT VITALS:  /74 (BP Location: Right arm, Patient Position: Lying)  Pulse 77  Temp 97.3 °F (36.3 °C) (Tympanic)   Resp 20  Ht 68\" (172.7 cm)  Wt 165 lb (74.8 kg)  SpO2 94%  BMI 25.09 kg/m2    PHYSICAL EXAMINATION:  Physical Exam   Alert, oriented ×3  HEENT: Normal  Neck: No mass  Chest: Clear  Heart: Regular rhythm  Abdomen: Soft, nontender, active BS  Extremities: No edema  Neuro: No focal deficit    Assessment/Plan   Diarrhea  History of colonic polyps  Colon cancer screening    REC  Colonoscopy is planned.  The procedure, benefits, risks and alternatives were discussed with the patient.         Sukhdeep Pierre III, MD  "

## 2017-06-09 LAB
LAB AP CASE REPORT: NORMAL
Lab: NORMAL
PATH REPORT.FINAL DX SPEC: NORMAL

## 2017-06-10 LAB
O+P SPEC MICRO: NORMAL
OVA + PARASITE RESULT 1: NORMAL

## 2017-06-14 ENCOUNTER — TELEPHONE (OUTPATIENT)
Dept: GASTROENTEROLOGY | Facility: CLINIC | Age: 82
End: 2017-06-14

## 2017-06-14 NOTE — TELEPHONE ENCOUNTER
I removed 6 polyps--all were benign.  No evidence of cancer.  I recommend that she have another colonoscopy performed in about one year.  MWB

## 2017-06-14 NOTE — TELEPHONE ENCOUNTER
Spoke with patient's daughter and told her procedure report and also placed her on a 1 year recall list to repeat Colonoscopy.

## 2018-06-13 ENCOUNTER — OFFICE VISIT (OUTPATIENT)
Dept: SURGERY | Facility: CLINIC | Age: 83
End: 2018-06-13

## 2018-06-13 VITALS
HEART RATE: 85 BPM | WEIGHT: 176 LBS | HEIGHT: 68 IN | DIASTOLIC BLOOD PRESSURE: 75 MMHG | SYSTOLIC BLOOD PRESSURE: 120 MMHG | BODY MASS INDEX: 26.67 KG/M2

## 2018-06-13 DIAGNOSIS — K43.2 INCISIONAL HERNIA, WITHOUT OBSTRUCTION OR GANGRENE: Primary | ICD-10-CM

## 2018-06-13 PROCEDURE — 99203 OFFICE O/P NEW LOW 30 MIN: CPT | Performed by: SURGERY

## 2018-06-14 PROBLEM — K43.2 INCISIONAL HERNIA, WITHOUT OBSTRUCTION OR GANGRENE: Status: ACTIVE | Noted: 2018-06-14

## 2018-06-14 NOTE — PROGRESS NOTES
Subjective   Janet Truong is a 83 y.o. female here for consultation from Juan Cisneros MD    Janet Truong is a 83 y.o. female presenting with complaint of midline incisional hernia easily reducible.  The hernia  been present for 1year(s).  The hernia is easily reducible. Patient denies  pain.  Patient denies  previous repair.  No obstructive symptoms reported.       Past Medical History:   Diagnosis Date   • Arthritis    • CHF (congestive heart failure)     x 1 time; she has never been placed in the hospital for heart failure   • Colon cancer 1/10/2017   • COPD (chronic obstructive pulmonary disease)     2 LITERS AT NIGHT AND PRN    • Coronary artery disease     3 CARDIAC STENTS 2001   • Essential tremor    • Essential tremor    • GERD (gastroesophageal reflux disease)    • Hypothyroid    • MI (myocardial infarction) 2001   • Wears dentures     UPPER AND LOWER   • Wears reading eyeglasses        Family History   Problem Relation Age of Onset   • Colon cancer Brother    • Prostate cancer Brother    • Heart attack Brother    • Heart disease Brother         MI at less than 60   • Cancer Brother         colon cancer in 2 brothers and prostate cancer and one brother   • Heart attack Other    • Colon cancer Other    • Heart failure Mother    • Heart disease Mother         CHF   • Heart attack Father    • Prostate cancer Father    • Cancer Father         Prostate   • Heart disease Father         unknown what kind   • Heart attack Sister    • Heart disease Sister         MI at less than 60   • Hypertension Daughter    • Arthritis Daughter         due to lupus   • Cancer Daughter         Thyroid CA   • Hypertension Son    • Cancer Paternal Grandmother         Throat and neck CA   • Cancer Paternal Grandfather         Stomach CA       Social History     Social History   • Marital status: Single     Spouse name: N/A   • Number of children: N/A   • Years of education: N/A     Occupational History   • Not  on file.     Social History Main Topics   • Smoking status: Former Smoker     Packs/day: 2.00     Years: 50.00     Types: Cigarettes     Start date: 1/1/1950     Quit date: 1/29/2001   • Smokeless tobacco: Never Used   • Alcohol use No   • Drug use: No   • Sexual activity: Defer     Other Topics Concern   • Not on file     Social History Narrative   • No narrative on file       Past Surgical History:   Procedure Laterality Date   • APPENDECTOMY     • CHOLECYSTECTOMY     • COLON RESECTION N/A 1/10/2017    Procedure: EXTENDED RIGHT HEMICOLECTOMY UMBILICAL HERNIA REPAIR;  Surgeon: Hernan Menon MD;  Location:  [facility];  Service:    • COLONOSCOPY N/A 12/2/2016    Procedure: COLONOSCOPY;  Surgeon: Sukhdeep Pierre III, MD;  Location: [facility] OR;  Service:    • COLONOSCOPY N/A 6/8/2017    Procedure: COLONOSCOPY FOR SCREENING  CPTCODE:65263;  Surgeon: Sukhdeep Pierre III, MD;  Location: [facility] OR;  Service:    • CORONARY ANGIOPLASTY WITH STENT PLACEMENT  2001    x 3   • ENDOSCOPY N/A 12/2/2016    Procedure: ESOPHAGOGASTRODUODENOSCOPY;  Surgeon: Sukhdeep Pierre III, MD;  Location: [facility] OR;  Service:    • HYSTERECTOMY         Review of Systems   Constitutional: Negative for activity change, appetite change, chills and fever.   HENT: Negative for sore throat and trouble swallowing.    Eyes: Negative for visual disturbance.   Respiratory: Negative for cough and shortness of breath.    Cardiovascular: Negative for chest pain and palpitations.   Gastrointestinal: Negative for abdominal distention, abdominal pain, blood in stool, constipation, diarrhea, nausea and vomiting.   Endocrine: Negative for cold intolerance and heat intolerance.   Genitourinary: Negative for dysuria.   Musculoskeletal: Negative for joint swelling.   Skin: Negative for color change, rash and wound.   Allergic/Immunologic: Negative for immunocompromised state.   Neurological: Negative for dizziness, seizures, weakness and headaches.  "  Hematological: Negative for adenopathy. Does not bruise/bleed easily.   Psychiatric/Behavioral: Negative for agitation and confusion.         /75   Pulse 85   Ht 172.7 cm (68\")   Wt 79.8 kg (176 lb)   LMP  (LMP Unknown)   BMI 26.76 kg/m²   Objective   Physical Exam   Constitutional: She is oriented to person, place, and time. She appears well-developed.   HENT:   Head: Normocephalic and atraumatic.   Mouth/Throat: Mucous membranes are normal.   Eyes: Conjunctivae are normal. Pupils are equal, round, and reactive to light.   Neck: Neck supple. No JVD present. No tracheal deviation present. No thyromegaly present.   Cardiovascular: Normal rate and regular rhythm.  Exam reveals no gallop and no friction rub.    No murmur heard.  Pulmonary/Chest: Effort normal and breath sounds normal.   Abdominal: Soft. She exhibits no distension. There is no splenomegaly or hepatomegaly. There is no tenderness. A hernia (midline incisional hernia reducible) is present.   Musculoskeletal: Normal range of motion. She exhibits no deformity.   Neurological: She is alert and oriented to person, place, and time.   Skin: Skin is warm and dry.   Psychiatric: She has a normal mood and affect.         Janet was seen today for ventral incisional hernia.    Diagnoses and all orders for this visit:    Incisional hernia, without obstruction or gangrene        Assessment     Janet Truong is a 83 y.o. female with reducible asymptomatic incisional hernia.  Risks and benefits of surgery been discussed with patient given her age and lack of symptoms that she will forego surgical repair at this time.  She is aware of symptoms that should prompt return to care.  Follow-up as needed  Patient's Body mass index is 26.76 kg/m². BMI is within normal parameters. No follow-up required.    I advised Janet of the risks of continuing to use tobacco, and I provided her with tobacco cessation educational materials in the After Visit " Summary.

## 2018-09-21 ENCOUNTER — TRANSCRIBE ORDERS (OUTPATIENT)
Dept: ADMINISTRATIVE | Facility: HOSPITAL | Age: 83
End: 2018-09-21

## 2018-09-21 DIAGNOSIS — R11.2 NAUSEA AND VOMITING, INTRACTABILITY OF VOMITING NOT SPECIFIED, UNSPECIFIED VOMITING TYPE: Primary | ICD-10-CM

## 2018-09-24 ENCOUNTER — HOSPITAL ENCOUNTER (OUTPATIENT)
Dept: GENERAL RADIOLOGY | Facility: HOSPITAL | Age: 83
Discharge: HOME | End: 2018-09-24
Admitting: FAMILY MEDICINE

## 2018-09-24 DIAGNOSIS — R11.2 NAUSEA AND VOMITING, INTRACTABILITY OF VOMITING NOT SPECIFIED, UNSPECIFIED VOMITING TYPE: ICD-10-CM

## 2018-09-24 PROCEDURE — 74241 FL UPPER GI SINGLE CONTRAST W KUB: CPT | Performed by: RADIOLOGY

## 2018-09-24 PROCEDURE — 74241: CPT

## 2018-09-25 ENCOUNTER — EPISODE CHANGES (OUTPATIENT)
Dept: CASE MANAGEMENT | Facility: OTHER | Age: 83
End: 2018-09-25

## (undated) DEVICE — TOTAL TRAY, URNMTR, SILV, LF, 16FR 10ML: Brand: MEDLINE

## (undated) DEVICE — CANNULA,ADULT,SOFT-TOUCH,7TUBE,SC: Brand: MEDLINE

## (undated) DEVICE — Device: Brand: DEFENDO AIR/WATER/SUCTION AND BIOPSY VALVE

## (undated) DEVICE — GLV SURG SENSICARE MICRO PF LF 8.5 STRL

## (undated) DEVICE — DRSNG WND GZ PAD BORDERED 4X8IN STRL

## (undated) DEVICE — SAFESECURE,SECUREMENT,FOLEY CATH,STERILE: Brand: MEDLINE

## (undated) DEVICE — TRAP,MUCUS SPECIMEN,40CC: Brand: MEDLINE

## (undated) DEVICE — SOL LR 1000ML

## (undated) DEVICE — AIRWY 90MM NO9

## (undated) DEVICE — TUBING, SUCTION, 1/4" X 20', STRAIGHT: Brand: MEDLINE INDUSTRIES, INC.

## (undated) DEVICE — GOWN,REINF,POLY,ECL,PP SLV,XL: Brand: MEDLINE

## (undated) DEVICE — SUT VIC 12X27 D8116 BX/12

## (undated) DEVICE — MEDI-VAC NON-CONDUCTIVE SUCTION TUBING: Brand: CARDINAL HEALTH

## (undated) DEVICE — WIPE THERAWASH SLV SPEC CARE 2PK

## (undated) DEVICE — SUT PDS 1 CTX 36IN VIO PDP371T

## (undated) DEVICE — LEX GENERAL ABDOMINAL SPLIT: Brand: MEDLINE INDUSTRIES, INC.

## (undated) DEVICE — SINGLE PORT MANIFOLD: Brand: NEPTUNE 2

## (undated) DEVICE — SNAR POLYP CAPTIFLX MICRO OVL 13MM 240CM

## (undated) DEVICE — Device

## (undated) DEVICE — SYR LUERLOK 30CC

## (undated) DEVICE — CONN Y IRR DISP 1P/U

## (undated) DEVICE — ADAPT ST INFUS ADMIN SYR 70IN

## (undated) DEVICE — MEDI-VAC YANKAUER SUCTION HANDLE W/BULBOUS TIP: Brand: CARDINAL HEALTH